# Patient Record
Sex: FEMALE | Race: WHITE | NOT HISPANIC OR LATINO | Employment: UNEMPLOYED | URBAN - NONMETROPOLITAN AREA
[De-identification: names, ages, dates, MRNs, and addresses within clinical notes are randomized per-mention and may not be internally consistent; named-entity substitution may affect disease eponyms.]

---

## 2018-10-09 ENCOUNTER — HOSPITAL ENCOUNTER (INPATIENT)
Facility: HOSPITAL | Age: 18
LOS: 2 days | Discharge: HOME | End: 2018-10-11
Attending: PSYCHIATRY & NEUROLOGY | Admitting: PSYCHIATRY & NEUROLOGY

## 2018-10-09 ENCOUNTER — HOSPITAL ENCOUNTER (EMERGENCY)
Facility: HOSPITAL | Age: 18
Discharge: PSYCHIATRIC OR SUBSTANCE USE FACILITY | End: 2018-10-09
Attending: EMERGENCY MEDICINE | Admitting: EMERGENCY MEDICINE

## 2018-10-09 VITALS
RESPIRATION RATE: 18 BRPM | HEART RATE: 61 BPM | OXYGEN SATURATION: 98 % | WEIGHT: 270 LBS | DIASTOLIC BLOOD PRESSURE: 71 MMHG | HEIGHT: 64 IN | SYSTOLIC BLOOD PRESSURE: 134 MMHG | TEMPERATURE: 97.1 F | BODY MASS INDEX: 46.1 KG/M2

## 2018-10-09 DIAGNOSIS — IMO0002 SELF-INFLICTED INJURY: Primary | ICD-10-CM

## 2018-10-09 DIAGNOSIS — F32.A DEPRESSION, UNSPECIFIED DEPRESSION TYPE: ICD-10-CM

## 2018-10-09 PROBLEM — F32.9 MAJOR DEPRESSIVE DISORDER: Status: ACTIVE | Noted: 2018-10-09

## 2018-10-09 LAB
6-ACETYL MORPHINE: NEGATIVE
ALBUMIN SERPL-MCNC: 4.3 G/DL (ref 3.5–5)
ALBUMIN/GLOB SERPL: 1.3 G/DL (ref 1.5–2.5)
ALP SERPL-CCNC: 83 U/L (ref 35–104)
ALT SERPL W P-5'-P-CCNC: 26 U/L (ref 10–36)
AMPHET+METHAMPHET UR QL: NEGATIVE
ANION GAP SERPL CALCULATED.3IONS-SCNC: 9.2 MMOL/L (ref 3.6–11.2)
AST SERPL-CCNC: 24 U/L (ref 10–30)
B-HCG UR QL: NEGATIVE
BACTERIA UR QL AUTO: ABNORMAL /HPF
BARBITURATES UR QL SCN: NEGATIVE
BASOPHILS # BLD AUTO: 0.03 10*3/MM3 (ref 0–0.3)
BASOPHILS NFR BLD AUTO: 0.2 % (ref 0–2)
BENZODIAZ UR QL SCN: NEGATIVE
BILIRUB SERPL-MCNC: 0.3 MG/DL (ref 0.2–1.8)
BILIRUB UR QL STRIP: NEGATIVE
BUN BLD-MCNC: 8 MG/DL (ref 7–21)
BUN/CREAT SERPL: 12.7 (ref 7–25)
BUPRENORPHINE SERPL-MCNC: NEGATIVE NG/ML
CALCIUM SPEC-SCNC: 9 MG/DL (ref 7.7–10)
CANNABINOIDS SERPL QL: NEGATIVE
CHLORIDE SERPL-SCNC: 108 MMOL/L (ref 99–112)
CLARITY UR: CLEAR
CO2 SERPL-SCNC: 23.8 MMOL/L (ref 24.3–31.9)
COCAINE UR QL: NEGATIVE
COLOR UR: ABNORMAL
CREAT BLD-MCNC: 0.63 MG/DL (ref 0.43–1.29)
DEPRECATED RDW RBC AUTO: 39.6 FL (ref 37–54)
EOSINOPHIL # BLD AUTO: 0.22 10*3/MM3 (ref 0–0.7)
EOSINOPHIL NFR BLD AUTO: 1.5 % (ref 0–5)
ERYTHROCYTE [DISTWIDTH] IN BLOOD BY AUTOMATED COUNT: 12.8 % (ref 11.5–14.5)
ETHANOL BLD-MCNC: <10 MG/DL
ETHANOL UR QL: <0.01 %
GFR SERPL CREATININE-BSD FRML MDRD: 122 ML/MIN/1.73
GLOBULIN UR ELPH-MCNC: 3.2 GM/DL
GLUCOSE BLD-MCNC: 87 MG/DL (ref 70–110)
GLUCOSE UR STRIP-MCNC: NEGATIVE MG/DL
HCT VFR BLD AUTO: 41.5 % (ref 37–47)
HGB BLD-MCNC: 13.8 G/DL (ref 12–16)
HGB UR QL STRIP.AUTO: ABNORMAL
HYALINE CASTS UR QL AUTO: ABNORMAL /LPF
IMM GRANULOCYTES # BLD: 0.05 10*3/MM3 (ref 0–0.03)
IMM GRANULOCYTES NFR BLD: 0.4 % (ref 0–0.5)
KETONES UR QL STRIP: ABNORMAL
LEUKOCYTE ESTERASE UR QL STRIP.AUTO: NEGATIVE
LYMPHOCYTES # BLD AUTO: 1.58 10*3/MM3 (ref 1–3)
LYMPHOCYTES NFR BLD AUTO: 11.1 % (ref 21–51)
MAGNESIUM SERPL-MCNC: 2.1 MG/DL (ref 1.6–2.2)
MCH RBC QN AUTO: 28.8 PG (ref 27–33)
MCHC RBC AUTO-ENTMCNC: 33.3 G/DL (ref 33–37)
MCV RBC AUTO: 86.6 FL (ref 80–94)
METHADONE UR QL SCN: NEGATIVE
MONOCYTES # BLD AUTO: 1.33 10*3/MM3 (ref 0.1–0.9)
MONOCYTES NFR BLD AUTO: 9.4 % (ref 0–10)
NEUTROPHILS # BLD AUTO: 10.99 10*3/MM3 (ref 1.4–6.5)
NEUTROPHILS NFR BLD AUTO: 77.4 % (ref 30–70)
NITRITE UR QL STRIP: NEGATIVE
OPIATES UR QL: NEGATIVE
OSMOLALITY SERPL CALC.SUM OF ELEC: 279 MOSM/KG (ref 273–305)
OXYCODONE UR QL SCN: NEGATIVE
PCP UR QL SCN: NEGATIVE
PH UR STRIP.AUTO: 5.5 [PH] (ref 5–8)
PLATELET # BLD AUTO: 242 10*3/MM3 (ref 130–400)
PMV BLD AUTO: 10.3 FL (ref 6–10)
POTASSIUM BLD-SCNC: 3.7 MMOL/L (ref 3.5–5.3)
PROT SERPL-MCNC: 7.5 G/DL (ref 6–8)
PROT UR QL STRIP: NEGATIVE
RBC # BLD AUTO: 4.79 10*6/MM3 (ref 4.2–5.4)
RBC # UR: ABNORMAL /HPF
REF LAB TEST METHOD: ABNORMAL
SODIUM BLD-SCNC: 141 MMOL/L (ref 135–153)
SP GR UR STRIP: 1.02 (ref 1–1.03)
SQUAMOUS #/AREA URNS HPF: ABNORMAL /HPF
UROBILINOGEN UR QL STRIP: ABNORMAL
WBC NRBC COR # BLD: 14.2 10*3/MM3 (ref 4.5–12.5)
WBC UR QL AUTO: ABNORMAL /HPF

## 2018-10-09 PROCEDURE — 85025 COMPLETE CBC W/AUTO DIFF WBC: CPT | Performed by: PHYSICIAN ASSISTANT

## 2018-10-09 PROCEDURE — 81001 URINALYSIS AUTO W/SCOPE: CPT | Performed by: PHYSICIAN ASSISTANT

## 2018-10-09 PROCEDURE — 80307 DRUG TEST PRSMV CHEM ANLYZR: CPT | Performed by: PHYSICIAN ASSISTANT

## 2018-10-09 PROCEDURE — 99283 EMERGENCY DEPT VISIT LOW MDM: CPT

## 2018-10-09 PROCEDURE — 81025 URINE PREGNANCY TEST: CPT | Performed by: PHYSICIAN ASSISTANT

## 2018-10-09 PROCEDURE — 80053 COMPREHEN METABOLIC PANEL: CPT | Performed by: PHYSICIAN ASSISTANT

## 2018-10-09 PROCEDURE — 83735 ASSAY OF MAGNESIUM: CPT | Performed by: PHYSICIAN ASSISTANT

## 2018-10-09 RX ORDER — FAMOTIDINE 20 MG/1
20 TABLET, FILM COATED ORAL 2 TIMES DAILY PRN
Status: DISCONTINUED | OUTPATIENT
Start: 2018-10-09 | End: 2018-10-11 | Stop reason: HOSPADM

## 2018-10-09 RX ORDER — BENZTROPINE MESYLATE 1 MG/1
1 TABLET ORAL DAILY PRN
Status: DISCONTINUED | OUTPATIENT
Start: 2018-10-09 | End: 2018-10-11 | Stop reason: HOSPADM

## 2018-10-09 RX ORDER — ALUMINA, MAGNESIA, AND SIMETHICONE 2400; 2400; 240 MG/30ML; MG/30ML; MG/30ML
15 SUSPENSION ORAL EVERY 6 HOURS PRN
Status: DISCONTINUED | OUTPATIENT
Start: 2018-10-09 | End: 2018-10-11 | Stop reason: HOSPADM

## 2018-10-09 RX ORDER — IBUPROFEN 600 MG/1
600 TABLET ORAL EVERY 6 HOURS PRN
Status: DISCONTINUED | OUTPATIENT
Start: 2018-10-09 | End: 2018-10-11 | Stop reason: HOSPADM

## 2018-10-09 RX ORDER — BENZTROPINE MESYLATE 1 MG/ML
0.5 INJECTION INTRAMUSCULAR; INTRAVENOUS DAILY PRN
Status: DISCONTINUED | OUTPATIENT
Start: 2018-10-09 | End: 2018-10-11 | Stop reason: HOSPADM

## 2018-10-09 RX ORDER — NICOTINE 21 MG/24HR
1 PATCH, TRANSDERMAL 24 HOURS TRANSDERMAL
Status: DISCONTINUED | OUTPATIENT
Start: 2018-10-10 | End: 2018-10-11 | Stop reason: HOSPADM

## 2018-10-09 RX ORDER — ONDANSETRON 4 MG/1
4 TABLET, FILM COATED ORAL EVERY 6 HOURS PRN
Status: DISCONTINUED | OUTPATIENT
Start: 2018-10-09 | End: 2018-10-11 | Stop reason: HOSPADM

## 2018-10-09 RX ORDER — HYDROXYZINE 50 MG/1
50 TABLET, FILM COATED ORAL EVERY 6 HOURS PRN
Status: DISCONTINUED | OUTPATIENT
Start: 2018-10-09 | End: 2018-10-11 | Stop reason: HOSPADM

## 2018-10-09 RX ORDER — LOPERAMIDE HYDROCHLORIDE 2 MG/1
2 CAPSULE ORAL 4 TIMES DAILY PRN
Status: DISCONTINUED | OUTPATIENT
Start: 2018-10-09 | End: 2018-10-11 | Stop reason: HOSPADM

## 2018-10-09 RX ORDER — ECHINACEA PURPUREA EXTRACT 125 MG
2 TABLET ORAL AS NEEDED
Status: DISCONTINUED | OUTPATIENT
Start: 2018-10-09 | End: 2018-10-11 | Stop reason: HOSPADM

## 2018-10-09 RX ORDER — TRAZODONE HYDROCHLORIDE 50 MG/1
50 TABLET ORAL NIGHTLY PRN
Status: DISCONTINUED | OUTPATIENT
Start: 2018-10-09 | End: 2018-10-11 | Stop reason: HOSPADM

## 2018-10-09 RX ORDER — BENZONATATE 100 MG/1
100 CAPSULE ORAL 3 TIMES DAILY PRN
Status: DISCONTINUED | OUTPATIENT
Start: 2018-10-09 | End: 2018-10-11 | Stop reason: HOSPADM

## 2018-10-09 RX ADMIN — HYDROXYZINE HYDROCHLORIDE 50 MG: 50 TABLET, FILM COATED ORAL at 21:40

## 2018-10-09 NOTE — ED PROVIDER NOTES
Subjective   17-year-old female presents to the ED today for a mental health evaluation.  She states she has been feeling very depressed for the past 2 months.  She states she has a history of anxiety and depression.  She denies any suicidal or homicidal ideations today.  She states she has been having some problems with her fiancé.  She states he got into an argument today which caused her depression to become worse and she cuts on both of her forearms with a piece of glass.  She states today was the first time she had upper cut.  She denies any drug use.  She states she does occasionally drink alcohol and she did have one drink today.  She states her appetite and sleep have been poor.  She states she is not currently on any medications.        History provided by:  Patient  Mental Health Problem   Presenting symptoms: depression and self-mutilation    Presenting symptoms: no suicidal thoughts    Degree of incapacity (severity):  Moderate  Onset quality:  Gradual  Duration:  2 months  Timing:  Constant  Progression:  Worsening  Chronicity:  Recurrent  Context: not alcohol use and not drug abuse    Treatment compliance:  Untreated  Relieved by:  Nothing  Worsened by:  Nothing  Associated symptoms: anxiety, appetite change, feelings of worthlessness, insomnia and poor judgment    Risk factors: hx of mental illness        Review of Systems   Constitutional: Positive for appetite change.   HENT: Negative.    Eyes: Negative.    Respiratory: Negative.    Cardiovascular: Negative.    Gastrointestinal: Negative.    Genitourinary: Negative.    Musculoskeletal: Negative.    Skin: Positive for wound.   Neurological: Negative.    Psychiatric/Behavioral: Positive for dysphoric mood, self-injury and sleep disturbance. Negative for suicidal ideas. The patient is nervous/anxious and has insomnia.    All other systems reviewed and are negative.      Past Medical History:   Diagnosis Date   • Anxiety    • Depression    •  Self-injurious behavior     Cut self 10/9/18       Allergies   Allergen Reactions   • Morphine Hives       Past Surgical History:   Procedure Laterality Date   • GASTROSCOPY     • TONSILLECTOMY      childhood       Family History   Problem Relation Age of Onset   • Schizophrenia Mother    • Alcohol abuse Mother    • No Known Problems Father    • Alcohol abuse Brother    • Drug abuse Brother        Social History     Social History   • Marital status: Single     Social History Main Topics   • Smoking status: Current Some Day Smoker     Packs/day: 1.00     Years: 4.00     Types: Cigarettes   • Smokeless tobacco: Never Used   • Alcohol use Yes      Comment: drinks occassionaly   • Drug use: No   • Sexual activity: Yes     Partners: Female     Other Topics Concern   • Not on file           Objective   Physical Exam   Constitutional: She is oriented to person, place, and time. She appears well-developed and well-nourished. No distress.   HENT:   Head: Normocephalic and atraumatic.   Right Ear: External ear normal.   Left Ear: External ear normal.   Nose: Nose normal.   Mouth/Throat: Oropharynx is clear and moist.   Eyes: Pupils are equal, round, and reactive to light. Conjunctivae and EOM are normal.   Neck: Normal range of motion. Neck supple.   Cardiovascular: Normal rate, regular rhythm, normal heart sounds and intact distal pulses.    Pulmonary/Chest: Effort normal and breath sounds normal.   Abdominal: Soft. Bowel sounds are normal.   Musculoskeletal: Normal range of motion.   Neurological: She is alert and oriented to person, place, and time.   Skin: Skin is warm and dry. Capillary refill takes less than 2 seconds.   Multiple superficial lacerations to both forearms   Psychiatric: Her speech is normal and behavior is normal. Cognition and memory are normal. She expresses impulsivity. She exhibits a depressed mood. She expresses no homicidal and no suicidal ideation.   Nursing note and vitals  reviewed.      Procedures           ED Course  ED Course as of Oct 09 2136   Tue Oct 09, 2018   1935 Medically clear for psych  [AH]      ED Course User Index  [AH] Talisha Duckworth, PA                  [facility]      Final diagnoses:   Self-inflicted injury   Depression, unspecified depression type            Mildred Bravo PA  10/09/18 2136

## 2018-10-09 NOTE — NURSING NOTE
"Assessment completed. Pt brought in by police when her fiance called them after pt became upset and cut bilateral inner forearms with a piece of glass. Pt noted to have superficial scratches to bilateral forearms. Pt states, \"I was trying to hurt myself not kill myself.\" Pt denies SI, HI and A/V hallucinations. Rates depression at 10/10, anxiety 8/10. Reports she has racing thoughts. Reports stressors as being from [place], having lived here for 8 months and having issues with her fiance. Pt denies drug use, reports that she drinks on occasion x 3 years. Her last drink was today. Pt reports that she was an inpatient in a mental health facility at age 14, they kept her for 1 week. She is not on any medications. Pt smokes 1 pack per day x 4 years. Reports sleep and appetite are poor. Intake process explained to patient. Any questions answered. Pt to TX room. Safety maintained.    Pt noted to have been changed to scrubs prior to my assessment.   "

## 2018-10-10 PROBLEM — F43.25 ADJUSTMENT DISORDER WITH MIXED DISTURBANCE OF EMOTIONS AND CONDUCT: Status: ACTIVE | Noted: 2018-10-09

## 2018-10-10 PROCEDURE — 93010 ELECTROCARDIOGRAM REPORT: CPT | Performed by: INTERNAL MEDICINE

## 2018-10-10 PROCEDURE — 93005 ELECTROCARDIOGRAM TRACING: CPT | Performed by: PSYCHIATRY & NEUROLOGY

## 2018-10-10 PROCEDURE — 99222 1ST HOSP IP/OBS MODERATE 55: CPT | Performed by: PSYCHIATRY & NEUROLOGY

## 2018-10-10 RX ADMIN — HYDROXYZINE HYDROCHLORIDE 50 MG: 50 TABLET, FILM COATED ORAL at 21:28

## 2018-10-10 RX ADMIN — HYDROXYZINE HYDROCHLORIDE 50 MG: 50 TABLET, FILM COATED ORAL at 15:09

## 2018-10-10 NOTE — PLAN OF CARE
Problem: Patient Care Overview  Goal: Plan of Care Review  Outcome: Ongoing (interventions implemented as appropriate)  PATIENT VERBALIZES ANXIETY THIS SHIFT. NO NEW ORDERS NOTED   10/10/18 4254   Coping/Psychosocial   Plan of Care Reviewed With patient   Coping/Psychosocial   Patient Agreement with Plan of Care agrees   Plan of Care Review   Progress no change       Problem: Overarching Goals (Adult)  Goal: Adheres to Safety Considerations for Self and Others  Outcome: Ongoing (interventions implemented as appropriate)    Goal: Optimized Coping Skills in Response to Life Stressors  Outcome: Ongoing (interventions implemented as appropriate)    Goal: Develops/Participates in Therapeutic [place] to Support Successful Transition  Outcome: Ongoing (interventions implemented as appropriate)

## 2018-10-10 NOTE — DISCHARGE INSTR - APPOINTMENTS
[facility]   [address] [address] [phone]    October 15 2018 at 10:00am with Silva      Please Fax Discharge Summary to [phone]

## 2018-10-10 NOTE — NURSING NOTE
Reviewed patient clinicals and lab work with Dr. GAETANO Greenberg. Admission orders read back and verified x 2. ED provider, patient and pharmacy aware.

## 2018-10-10 NOTE — PLAN OF CARE
Problem: Patient Care Overview  Goal: Plan of Care Review  Outcome: Ongoing (interventions implemented as appropriate)   10/10/18 0104   Coping/Psychosocial   Plan of Care Reviewed With patient   Coping/Psychosocial   Patient Agreement with Plan of Care agrees   Plan of Care Review   Progress no change   OTHER   Outcome Summary New Admit

## 2018-10-10 NOTE — PLAN OF CARE
Problem: Patient Care Overview  Goal: Individualization and Mutuality  Outcome: Ongoing (interventions implemented as appropriate)   10/10/18 1139 10/10/18 1144   Individualization   Patient Specific Preferences --  None specified    Patient Specific Goals (Include Timeframe) --  Patient will receive inpatient stabilization over 2-7 days approximately    Patient Specific Interventions --  Therapist will offer 1-4 indvidual sessions, family education, aftercare planning    Mutuality/Individual Preferences   What Anxieties, Fears, Concerns, or Questions Do You Have About Your Care? --  None identified    What Information Would Help Us Give You More Personalized Care? --  None identified    How Would You and/or Your Support Person Like to Participate in Your Care? --  Patient has signed consent to involve her sister Sonam in plan of care    Mutuality/Individual Preferences   How to Address Anxieties/Fears --  NA    Personal Strengths/Vulnerabilities   Patient Personal Strengths expressive of needs;expressive of emotions;family/social support;realistic evaluation of current/future capabilities;resilient;resourceful;spiritual/Anabaptism support;stable living environment;motivated for recovery --    Patient Vulnerabilities Ineffective coping, self harm  --          Goal: Discharge Needs Assessment  Outcome: Ongoing (interventions implemented as appropriate)   10/10/18 1144   Discharge Needs Assessment   Readmission Within the Last 30 Days no previous admission in last 30 days   Concerns to be Addressed mental health;coping/stress;relationship   Concerns Comments Patient will deny SI/HI and acute symptoms prior to discharge.    Patient/Family Anticipates Transition to home with family   Patient/Family Anticipated Services at Transition outpatient care;mental health services   Transportation Concerns car, none   Transportation Anticipated family or friend will provide   Offered/Gave Vendor List no   Patient's Choice of  Community Agency(s) Patient has signed consent for [facility]    Current Discharge Risk psychiatric illness   Discharge Coordination/Progress Patient plans to stay with family in [place] at discharge. Patient has signed consent for [facility]. Patient does not have insurance for discharge medications and may need assistance.    Discharge Needs Assessment,    Outpatient/Agency/Support Group Needs outpatient medication management;outpatient psychiatric care (specify);outpatient counseling   Anticipated Discharge Disposition home or self-care

## 2018-10-10 NOTE — H&P
INITIAL PSYCHIATRIC HISTORY & PHYSICAL    Patient Identification:  Name:     Angeline Zamudio  Age:    17 y.o.  Sex:    female  :     2000  MRN:    [id number]  Visit Number:    [id number]  Primary Care Physician:    Provider, No Known    SUBJECTIVE    CC/Focus of Exam: Thoughts of suicide and attempt, depression, anxiety, hopelessness  HPI: Angeline Zamudio is a 17 y.o.  female who was brought to emergency [facility] of [facility] by police officers.  Patient cut herself and called micah and told her and her fijude called the  that she did not come to check on her.  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  Patient has become very depressed and rating depression 10 and anxiety 10 on a scale of 1-10 and yesterday she felt hopeless and very frustrated and that's why she was cutting herself.  Patient seemed to be accepting that the relationship might be over and she wants to go back to her family in [place] as soon as she gets discharged from the hospital.  Patient's sleep has been poor and has been experiencing initial, intermittent and terminal insomnia.  Appetite has gone down slow patient is an obese person.  She has been feeling tired with low energy.  She has become indecisive and lost interest in the things she used to enjoy.  Patient denies history of physical or sexual abuse of the past.  She says her mother was an alcoholic however has been sober now for 7 years and her father raised her mostly at least up to age 14.  She also has a stepfather whom she says is good to her and as a matter of fact he even bought her a  car.  Patient denies any drug use that she drinks occasionally and she says she never gets drunk and she says she might drink 2-3 mixed drinks such as a strawberry daiquiri.  Patient is admitted for crisis intervention, stabilization and securing her safety.    Available medical/psychiatric records reviewed and incorporated into the current document.     PAST PSYCHIATRIC HX:  At age 14 patient was admitted to a psychiatric facility for a week.  She also was [facility].    SUBSTANCE USE HX:  As outlined in history of present a less patient denies drug use she drinks alcohol socially and occasionally.  She is smoking a pack of cigarettes a day.    SOCIAL HX:  Patient was born and raised in [place].  Patient was raised mostly by her father.  Parents  a long time ago.  Patient has a half-sister who is maternal but she says patient's father raised her and she has avoided brother.  She says her brother also was alcohol and drug user but no a sober.  Her mother and her sister both are RNs.  Patient has also couple of siblings who are paternal and she says her stepmother is expecting at this time.  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  Patient says that her family are supportive of her sexual orientation.    Past Medical History:   Diagnosis Date   • Anxiety    • Depression    • Self-injurious behavior     Cut self 10/9/18       Past Surgical History:   Procedure Laterality Date   • GASTROSCOPY     • TONSILLECTOMY      childhood       Family History   Problem Relation Age of Onset   • Schizophrenia Mother    • Alcohol abuse Mother    • No Known Problems Father    • Alcohol abuse Brother    • Drug abuse Brother          No prescriptions prior to admission.       Reviewed available past medical and psychiatric records.    ALLERGIES:  Morphine    Temp:  [97.3 °F (36.3 °C)-98.3 °F (36.8 °C)] 97.3 °F (36.3  °C)  Heart Rate:  [52-78] 52  Resp:  [18-20] 18  BP: (113-137)/(56-82) 113/61    REVIEW OF SYSTEMS:  Review of Systems   Constitutional: Negative.    HENT: Negative.    Eyes: Negative.    Respiratory: Negative.    Gastrointestinal: Negative.    Endocrine: Negative.    Genitourinary: Negative.    Musculoskeletal: Negative.    Skin: Negative.    Allergic/Immunologic: Negative.    Hematological: Negative.    Psychiatric/Behavioral: Positive for dysphoric mood.        OBJECTIVE    PHYSICAL EXAM:  Physical Exam     Physical Exam   Constitutional: oriented to person, place, and time. Appears well-developed and well-nourished.   HENT:   Head: Normocephalic and atraumatic.   Right Ear: External ear normal.   Left Ear: External ear normal.   Mouth/Throat: Oropharynx is clear and moist.   Eyes: Pupils are equal, round, and reactive to light. Conjunctivae and EOM are normal.   Neck: Normal range of motion. Neck supple.   Cardiovascular: Normal rate, regular rhythm and normal heart sounds.    Pulmonary/Chest: Effort normal and breath sounds normal. No respiratory distress. No wheezes.   Abdominal: Soft. Bowel sounds are normal.No distension. There is no tenderness.   Musculoskeletal: Normal range of motion. No edema or deformity.   Neurological:Alert and oriented to person, place, and time. No cranial nerve deficit. Coordination normal.   Skin: Skin is warm and dry. No rash noted. No erythema.         MENTAL STATUS EXAM:    Patient is a 17-year-old  female in the hospital gown.  Affect is appropriate.  She is cooperative and pleasant.  Her mood is depressed and anxious and rates it 8 on a scale of 1-10 and she feels hopeless, helpless and worthless.  Patient today denies thoughts of suicide however yesterday she cut her upper extremities though superficiality in the piece of glass.  She denies homicidal thoughts.  She is not delusional and not experiencing any perceptual distortion such as auditory or visual  hallucinations.  Her sensorium is intact saw her immediate, recent, recent past and long-term memories.  Her intellect is average and better.  Her insight and judgment are questionable.      Imaging Results (last 24 hours)     ** No results found for the last 24 hours. **           ECG/EMG Results (most recent)     Procedure Component Value Units Date/Time    ECG 12 Lead [[id number]] Collected:  10/10/18 0906     Updated:  10/10/18 0916    Narrative:       Test Reason : Potential adverse reaction to medications.  Blood Pressure : **/** mmHG  Vent. Rate : 059 BPM     Atrial Rate : 059 BPM     P-R Int : 152 ms          QRS Dur : 090 ms      QT Int : 416 ms       P-R-T Axes : 030 054 049 degrees     QTc Int : 411 ms    Sinus bradycardia with sinus arrhythmia  Otherwise normal ECG  No previous ECGs available    Referred By:  EMEKA           Confirmed By:            Lab Results   Component Value Date    GLUCOSE 87 10/09/2018    BUN 8 10/09/2018    CREATININE 0.63 10/09/2018    EGFRIFNONA 122 10/09/2018    BCR 12.7 10/09/2018    CO2 23.8 (L) 10/09/2018    CALCIUM 9.0 10/09/2018    ALBUMIN 4.30 10/09/2018    AST 24 10/09/2018    ALT 26 10/09/2018       Lab Results   Component Value Date    WBC 14.20 (H) 10/09/2018    HGB 13.8 10/09/2018    HCT 41.5 10/09/2018    MCV 86.6 10/09/2018     10/09/2018       Pain Management Panel     Pain Management Panel Latest Ref Rng & Units 10/9/2018    AMPHETAMINES SCREEN, URINE Negative Negative    BARBITURATES SCREEN Negative Negative    BENZODIAZEPINE SCREEN, URINE Negative Negative    BUPRENORPHINE Negative Negative    COCAINE SCREEN, URINE Negative Negative    METHADONE SCREEN, URINE Negative Negative          Brief Urine Lab Results  (Last result in the past 365 days)      Color   Clarity   Blood   Leuk Est   Nitrite   Protein   CREAT   Urine HCG        10/09/18 1907 Dark Yellow(A) Clear Large (3+)(A) Negative Negative Negative         10/09/18 1907               Negative            Reviewed labs and studies done with this admission.       ASSESSMENT & PLAN:        Adjustment disorder with mixed disturbance of emotions and conduct        The patient has been admitted for safety and stabilization.  Patient will be monitored for [id number]  and maintained on Suicide precaution Level 3 (q15 min checks) . she is followed with daily clinical evaluation and med management.    The patient will have individual and group therapy with a master's level therapist. A master treatment plan will be developed and agreed upon by the patient and his/her treatment team.  The patient's estimated length of stay in the hospital is 1-2 days.     This note was generated using a scribe, Tono Monte The work documented in this note was completed, reviewed, and approved by the attending psychiatrist as designated Dr. Indra Lawson signature.     I, Indra Lawson MD, personally performed the services described in this documentation as scribed by the above named individual in my presence, and it is both accurate and complete.

## 2018-10-10 NOTE — NURSING NOTE
Presented to ED via police.  States that she cut herself with a piece of glass following a conflict with her fiance.  Her fiance was not home and feared that pt might harm herself further, so she contacted the police.  Pt denies suicide intent stating that she was just trying to hurt herself.  Denies any previous hx of self harming behavior or suicide attempts. Does reports hx of anxiety and depression, and an inpatient admission when she was 14.  No current mental health treatment or medications.

## 2018-10-10 NOTE — PROGRESS NOTES
1100:       DATA:       Therapist met individually with patient this date to introduce role and to discuss hospitalization expectations. Patient agreeable.     Therapist provided emotional support and education this date.   Discussed the therapist/patient relationship and explain the parameters and limitations of relative confidentiality.  Also discussed the importance active participation, and honesty to the treatment process.  Encouraged patient to utilize individual sessions to discuss/vent their feelings, frustrations, and fears.      Therapist completed integrated summary, treatment plan, and initiated social history this date.  Therapist is strongly recommending a family session prior to discharge.  Patient signed consent for her sister Michelle Carr at [phone]; no answer/left voicemail     1205: Therapist received call back from patient's sister Sonam.  She appeared very supportive and reports that patient will be staying with her at discharge. She reports no concerns with patient's safety if discharged today or tomorrow.  Sonam appeared to believe that patient's main issue was possibly her relationship and leaving it/returning home would be the best plan.       ASSESSMENT:     Ms. Angeline Zamudio is a 17 year old , single, unemployed female.  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  Patient last employed 1 week ago at [employer].  Patient resides in [facility] x 6 months.  Patient originally from [facility].  Patient required admission due to self harming behavior.  Patient reports that she cut herself superficially on her forearms due to being upset/frustrated. She denies SI intent.  Patient reports that she and her fiance were arguing about the status of their relationship. She had also found out that her fiance cheated on her.  Patient reports that her fiance called the police.  Patient states that the relationship is probably over and that she has  already made plans to return to [place] and live with her sister.  Patient has history of admission to [facility] which is an inpatient facility at age 14.  She denies SI attempts.  Patient was primarily raised by her father.  She reports that her mother had a history of alcoholism, but is now sober and a nurse.  She reports having a supportive childhood and denies abuse.  Patient denies alcohol/drug issues.  She does admit to drinking 2-3 strawberry daiquiries occassionally, but denies abuse. Today, patient is calm and cooperative.  Patient appears to minimize events leading to hospitalization and requests discharge home.  Patient reports history of outpatient treatment at [facility] as a teenager, and requests new referral. Patient plans to return to [place] with family at discharge.       PLAN:      Patient to remain hospitalized this date.     Treatment team will focus efforts on stabilizing patient's acute symptoms while providing education on healthy coping and crisis management to reduce hospitalizations.   Patient requires daily psychiatrist evaluation and 24/7 nursing supervision to promote patient  safety.    Therapist will offer 1-4 individual sessions (20-30 minutes each), 1 therapy group daily, family education, and appropriate referral.    Therapist recommends outpatient psychiatric referral. Patient reports that she is moving to [place] at discharge and requested appointment there.  Therapist gained consent for [facility]; patient has seen therapist Silva there.

## 2018-10-11 VITALS
SYSTOLIC BLOOD PRESSURE: 113 MMHG | BODY MASS INDEX: 43.71 KG/M2 | DIASTOLIC BLOOD PRESSURE: 61 MMHG | WEIGHT: 256 LBS | TEMPERATURE: 97.3 F | HEART RATE: 52 BPM | OXYGEN SATURATION: 98 % | RESPIRATION RATE: 18 BRPM | HEIGHT: 64 IN

## 2018-10-11 PROCEDURE — 99238 HOSP IP/OBS DSCHRG MGMT 30/<: CPT | Performed by: PSYCHIATRY & NEUROLOGY

## 2018-10-11 NOTE — PLAN OF CARE
Problem: Patient Care Overview  Goal: Plan of Care Review  Outcome: Ongoing (interventions implemented as appropriate)  Patient reports eating and sleeping well; rates anxiety 3; depression 2; denies SI/HI/AVH; reoriented to time; pt calm and cooperative.   10/11/18 0118   Coping/Psychosocial   Plan of Care Reviewed With patient   Coping/Psychosocial   Patient Agreement with Plan of Care agrees   Plan of Care Review   Progress no change

## 2018-10-11 NOTE — PLAN OF CARE
Problem: Patient Care Overview  Goal: Plan of Care Review  Outcome: Outcome(s) achieved Date Met: 10/11/18   10/11/18 1234   Coping/Psychosocial   Plan of Care Reviewed With patient   Coping/Psychosocial   Patient Agreement with Plan of Care agrees   Plan of Care Review   Progress improving   OTHER   Outcome Summary Ready for discharge.       Problem: Overarching Goals (Adult)  Goal: Adheres to Safety Considerations for Self and Others  Outcome: Outcome(s) achieved Date Met: 10/11/18    Goal: Optimized Coping Skills in Response to Life Stressors  Outcome: Outcome(s) achieved Date Met: 10/11/18    Goal: Develops/Participates in Therapeutic [place] to Support Successful Transition  Outcome: Outcome(s) achieved Date Met: 10/11/18      Problem: Suicidal Behavior (Adult)  Goal: Suicidal Behavior is Absent/Minimized/Managed  Outcome: Outcome(s) achieved Date Met: 10/11/18      Problem: Behavior Regulation (Impulse Control) Impairment (Nonsuicidal Self-injury) (Adult)  Goal: Improved Behavior Regulation and Impulse Control (Nonsuicidal Self-injury)  Outcome: Outcome(s) achieved Date Met: 10/11/18      Problem: Emotion/Mood Impairment (Nonsuicidal Self-injury) (Adult)  Goal: Improved Mood Symptoms (Nonsuicidal Self-injury)  Outcome: Outcome(s) achieved Date Met: 10/11/18

## 2018-10-11 NOTE — DISCHARGE SUMMARY
"      PSYCHIATRIC DISCHARGE SUMMARY     Patient Identification:  Name:  Angeline aZmudio  Age:  17 y.o.  Sex:  female  :  2000  MRN:  [id number]  Visit Number:  [id number]      Date of Admission:10/9/2018   Date of Discharge:  10/11/2018    Discharge Diagnosis:  Active Problems:    Adjustment disorder with mixed disturbance of emotions and conduct        Admission Diagnosis:  Major depressive disorder [F32.9]     Hospital Course  Patient is a 17 y.o. female presented with superficial self inflicted lacerations to both her arms after a breakup with her girlfriend. She called her friend but her friend called the police who brought the patient to the hospital. The patient was admitted to the [facility] unit for safety, further evaluation and treatment.  The patient reported that it was an impulsive thing to do and reported that she was over the relationship. She agreed to be monitored for safety and if she continued to do well to be discharged next day.  The patient was also able to take part in individual and group counseling sessions and work on appropriate coping skills.  The patient made steady improvement in her mood and expressed feeling more positive and hopeful about future. Sleep and appetite were improved.  The day of discharge the patient was calm, cooperative and pleasant. Mood was reported to be good, and denied SI/HI/AVH. Also reported no medication side effects.        Mental Status Exam upon discharge:   Mood \"good\"   Affect-congruent, appropriate, stable  Thought Content-goal directed, no delusional material present  Thought process-linear, organized.  Suicidality: No SI  Homicidality: No HI  Perception: No AH/VH    Procedures Performed         Consults:   Consults     No orders found from 9/10/2018 to 10/10/2018.          Pertinent Test Results:   CBC, CMP, UA and UDS were unremarkable except WBC 14.20.    Condition on Discharge:  improved    Vital Signs  Temp:  [97.3 °F (36.3 °C)-98.3 " °F (36.8 °C)] 97.3 °F (36.3 °C)  Heart Rate:  [52-78] 52  Resp:  [18-20] 18  BP: (113-137)/(56-82) 113/61      Discharge Disposition:  Home or Self Care    Discharge Medications:     Discharge Medications      Patient Not Prescribed Medications Upon Discharge         Discharge Diet: Regular    Activity at Discharge: As tolerated    Follow-up Appointments  Future Appointments  Date Time Provider Department Center   10/12/2018 3:00 PM Alix Frausto PA [facility]  [facility] None         Clinician:   Indra Lawson MD  10/11/18  12:06 PM

## 2018-10-11 NOTE — PROGRESS NOTES
1050:      DATA:      Therapist met individually with patient this date. Reintroduced self to patient from initial assessment began yesterday.  Discussed progress in treatment and any needs/concerns. Patient reports that she had a good night, but is eager to return home.  She reports that a friend will pick her up so that she can get her car and dog in [place]. Patient plans to go to her sister Sonam's house from there.  Therapist contacted Sonam at [phone]; she reports that she is agreeable to this plan and does not have safety concerns with patient returning to her home.  Therapist reviewed aftercare appointment with [facility].  Patient/family agreeable.       ASSESSMENT:     Patient observed to have appropriate affect and congruent mood this date. Patient denies SI/HI and acute symptoms this date.  Patient appears calm, cooperative, and future oriented.      Plan:    Patient to discharge home this date.  She plans to have a friend pick her up and take her to her car.  Patient plans to drive to family's home in [place] from there.        Assisted patient in identifying risk factors which would indicate the need for higher level of care including thoughts to harm self or others and/or self-harming behavior and encouraged patient to contact this office, call 911, or present to the nearest emergency room should any of these events occur. Discussed crisis intervention services and means to access.  Patient adamantly and convincingly denies current suicidal or homicidal ideation or perceptual disturbance.

## 2019-08-02 ENCOUNTER — OFFICE VISIT (OUTPATIENT)
Dept: FAMILY MEDICINE CLINIC | Facility: CLINIC | Age: 19
End: 2019-08-02

## 2019-08-02 VITALS
HEART RATE: 82 BPM | OXYGEN SATURATION: 98 % | DIASTOLIC BLOOD PRESSURE: 74 MMHG | WEIGHT: 264 LBS | SYSTOLIC BLOOD PRESSURE: 126 MMHG | RESPIRATION RATE: 16 BRPM | HEIGHT: 64 IN | BODY MASS INDEX: 45.07 KG/M2 | TEMPERATURE: 97.2 F

## 2019-08-02 DIAGNOSIS — G89.29 OTHER CHRONIC BACK PAIN: Chronic | ICD-10-CM

## 2019-08-02 DIAGNOSIS — F41.9 ANXIETY AND DEPRESSION: Primary | Chronic | ICD-10-CM

## 2019-08-02 DIAGNOSIS — F32.A ANXIETY AND DEPRESSION: Primary | Chronic | ICD-10-CM

## 2019-08-02 DIAGNOSIS — M54.9 OTHER CHRONIC BACK PAIN: Chronic | ICD-10-CM

## 2019-08-02 PROCEDURE — 99203 OFFICE O/P NEW LOW 30 MIN: CPT | Performed by: NURSE PRACTITIONER

## 2019-08-02 RX ORDER — PHENTERMINE HYDROCHLORIDE 37.5 MG/1
37.5 TABLET ORAL
COMMUNITY
End: 2020-07-30

## 2019-08-02 NOTE — PROGRESS NOTES
"Angeline Zamudio is a 18 y.o. female who presents to the office today to establish care. She is single and is employed at the [employer].  Her concerns today include: Anxiety and Depression: a recent ED visit for a Panic Attack which the cause was determined to be the Adipex she had started. She has reduced her daily dose to half and she has had no further attacks. The records have been requested and reviewed.   She also would like to pursue Breast Reduction. She shares she has experienced daily upper back and shoulder pain due to her breast size since high school. They also impact her sleep \" they suffocate me at times during the night\".      Anxiety   Presents for initial visit. Onset was more than 5 years ago. The problem has been gradually improving. Symptoms include depressed mood (Improving), excessive worry, insomnia, irritability, malaise, nervous/anxious behavior, panic and restlessness. Patient reports no chest pain, confusion, decreased concentration, dizziness, dry mouth, hyperventilation, muscle tension, nausea, obsessions, palpitations, shortness of breath or suicidal ideas. Primary symptoms comment: BiPolar. Symptoms occur most days. Current severity: Varies. Exacerbated by: Varies..Worse with PMS. The quality of sleep is poor. Nighttime awakenings: several.     Risk factors include family history. Treatments tried: Lexapro. The treatment provided no relief.      Vitals:    08/02/19 1021   BP: 126/74   BP Location: Left arm   Patient Position: Sitting   Pulse: 82   Resp: 16   Temp: 97.2 °F (36.2 °C)   TempSrc: Temporal   SpO2: 98%   Weight: 120 kg (264 lb)   Height: 162.6 cm (64\")      The following portions of the patient's history were reviewed and updated as appropriate: allergies, current medications, past family history, past medical history, past social history, past surgical history and problem list.    Review of Systems   Constitutional: Positive for irritability. Negative for " activity change, appetite change, chills, fatigue, fever and unexpected weight change.   HENT: Negative.    Eyes: Negative for visual disturbance.   Respiratory: Negative for cough, chest tightness, shortness of breath and wheezing.    Cardiovascular: Negative for chest pain, palpitations and leg swelling.   Gastrointestinal: Negative for abdominal pain, constipation, diarrhea, nausea and vomiting.   Endocrine: Negative for cold intolerance, heat intolerance, polydipsia, polyphagia and polyuria.   Musculoskeletal: Positive for back pain.   Skin: Negative for color change and rash.   Neurological: Negative for dizziness, tremors, speech difficulty, weakness, light-headedness and headaches.   Psychiatric/Behavioral: Negative for confusion, decreased concentration, self-injury and suicidal ideas. The patient is nervous/anxious and has insomnia.    All other systems reviewed and are negative.    Physical Exam   Constitutional: She is oriented to person, place, and time. She appears well-developed and well-nourished. No distress.   HENT:   Head: Normocephalic.   Right Ear: Tympanic membrane and ear canal normal.   Left Ear: Tympanic membrane and ear canal normal.   Nose: Nose normal.   Mouth/Throat: Oropharynx is clear and moist and mucous membranes are normal. No oropharyngeal exudate.   Eyes: Conjunctivae and EOM are normal. Pupils are equal, round, and reactive to light. Right eye exhibits no discharge. Left eye exhibits no discharge. No scleral icterus.   Neck: Neck supple. No JVD present. No thyromegaly present.   Cardiovascular: Normal rate, regular rhythm and normal heart sounds. Exam reveals no friction rub.   No murmur heard.  Pulmonary/Chest: Effort normal and breath sounds normal. No respiratory distress. She has no decreased breath sounds. She has no wheezes. She has no rhonchi. She has no rales.   Abdominal: Soft. Bowel sounds are normal. She exhibits no distension. There is no tenderness. There is no  rebound and no guarding.   Musculoskeletal: She exhibits tenderness (Upper back and shoulder region). She exhibits no edema.   Lymphadenopathy:     She has no cervical adenopathy.   Neurological: She is alert and oriented to person, place, and time.   Skin: Skin is warm and dry. Capillary refill takes less than 2 seconds. No rash noted. No erythema.   Psychiatric: She has a normal mood and affect. Her speech is normal and behavior is normal. Judgment and thought content normal.   Vitals reviewed.    Assessment/Plan     Patient's Body mass index is 45.32 kg/m². BMI is above normal parameters. Recommendations include: exercise counseling, nutrition counseling and continue with weight management program.   (Normal BMI:  18.5-24.9, OW 25-29.9, Obesity 30 or greater)    Assessment/Plan (cont)  Problems Addressed this Visit        Digestive    BMI 45.0-49.9, adult (CMS/HCC)    Relevant Orders    Lipid Panel       Nervous and Auditory    Chronic back pain    Relevant Orders    Ambulatory Referral to General Surgery (Completed)      Other Visit Diagnoses     Anxiety and depression  (Chronic)   -  Primary    Referral placed for Behavioral Health. Labs ordered    Relevant Orders    Ambulatory Referral to Behavioral Health    CBC & Differential    Comprehensive Metabolic Panel    TSH        Findings and recommendations discussed with Angeline. Treatment options reviewed. Referral made to Behavioral Health for Chronic Anxiety and Depression. Referral sent for consult with Dr Lalita Gerber in [place], Angeline's preference. Labs ordered for her to have completed and she will f/u with me at the end of August; sooner if problems/concerns occur. Lifestyle modifications including nutrition and exercise encouraged.         This document has been electronically signed by SYDNEY Arnold, BENTON-BC, ALEKS

## 2019-08-02 NOTE — PATIENT INSTRUCTIONS
Living With Anxiety    After being diagnosed with an anxiety disorder, you may be relieved to know why you have felt or behaved a certain way. It is natural to also feel overwhelmed about the treatment ahead and what it will mean for your life. With care and support, you can manage this condition and recover from it.  How to cope with anxiety  Dealing with stress  Stress is your body’s reaction to life changes and events, both good and bad. Stress can last just a few hours or it can be ongoing. Stress can play a major role in anxiety, so it is important to learn both how to cope with stress and how to think about it differently.  Talk with your health care provider or a counselor to learn more about stress reduction. He or she may suggest some stress reduction techniques, such as:  · Music therapy. This can include creating or listening to music that you enjoy and that inspires you.  · Mindfulness-based meditation. This involves being aware of your normal breaths, rather than trying to control your breathing. It can be done while sitting or walking.  · Centering prayer. This is a kind of meditation that involves focusing on a word, phrase, or sacred image that is meaningful to you and that brings you peace.  · Deep breathing. To do this, expand your stomach and inhale slowly through your nose. Hold your breath for 3-5 seconds. Then exhale slowly, allowing your stomach muscles to relax.  · Self-talk. This is a skill where you identify thought patterns that lead to anxiety reactions and correct those thoughts.  · Muscle relaxation. This involves tensing muscles then relaxing them.  Choose a stress reduction technique that fits your lifestyle and personality. Stress reduction techniques take time and practice. Set aside 5-15 minutes a day to do them. Therapists can offer training in these techniques. The training may be covered by some insurance plans. Other things you can do to manage stress include:  · Keeping a  stress diary. This can help you learn what triggers your stress and ways to control your response.  · Thinking about how you respond to certain situations. You may not be able to control everything, but you can control your reaction.  · Making time for activities that help you relax, and not feeling guilty about spending your time in this way.  Therapy combined with coping and stress-reduction skills provides the best chance for successful treatment.  Medicines  Medicines can help ease symptoms. Medicines for anxiety include:  · Anti-anxiety drugs.  · Antidepressants.  · Beta-blockers.  Medicines may be used as the main treatment for anxiety disorder, along with therapy, or if other treatments are not working. Medicines should be prescribed by a health care provider.  Relationships  Relationships can play a big part in helping you recover. Try to spend more time connecting with trusted friends and family members. Consider going to couples counseling, taking family education classes, or going to family therapy. Therapy can help you and others better understand the condition.  How to recognize changes in your condition  Everyone has a different response to treatment for anxiety. Recovery from anxiety happens when symptoms decrease and stop interfering with your daily activities at home or work. This may mean that you will start to:  · Have better concentration and focus.  · Sleep better.  · Be less irritable.  · Have more energy.  · Have improved memory.  It is important to recognize when your condition is getting worse. Contact your health care provider if your symptoms interfere with home or work and you do not feel like your condition is improving.  Where to find help and support:  You can get help and support from these sources:  · Self-help groups.  · Online and community organizations.  · A trusted spiritual leader.  · Couples counseling.  · Family education classes.  · Family therapy.  Follow these instructions  at home:  · Eat a healthy diet that includes plenty of vegetables, fruits, whole grains, low-fat dairy products, and lean protein. Do not eat a lot of foods that are high in solid fats, added sugars, or salt.  · Exercise. Most adults should do the following:  ? Exercise for at least 150 minutes each week. The exercise should increase your heart rate and make you sweat (moderate-intensity exercise).  ? Strengthening exercises at least twice a week.  · Cut down on caffeine, tobacco, alcohol, and other potentially harmful substances.  · Get the right amount and quality of sleep. Most adults need 7-9 hours of sleep each night.  · Make choices that simplify your life.  · Take over-the-counter and prescription medicines only as told by your health care provider.  · Avoid caffeine, alcohol, and certain over-the-counter cold medicines. These may make you feel worse. Ask your pharmacist which medicines to avoid.  · Keep all follow-up visits as told by your health care provider. This is important.  Questions to ask your health care provider  · Would I benefit from therapy?  · How often should I follow up with a health care provider?  · How long do I need to take medicine?  · Are there any long-term side effects of my medicine?  · Are there any alternatives to taking medicine?  Contact a health care provider if:  · You have a hard time staying focused or finishing daily tasks.  · You spend many hours a day feeling worried about everyday life.  · You become exhausted by worry.  · You start to have headaches, feel tense, or have nausea.  · You urinate more than normal.  · You have diarrhea.  Get help right away if:  · You have a racing heart and shortness of breath.  · You have thoughts of hurting yourself or others.  If you ever feel like you may hurt yourself or others, or have thoughts about taking your own life, get help right away. You can go to your nearest emergency department or call:  · Your local emergency services  "(911 in the [place]  · A suicide crisis helpline, such as the National Suicide Prevention Lifeline at 1-183.760.9333. This is open 24-hours a day.  Summary  · Taking steps to deal with stress can help calm you.  · Medicines cannot cure anxiety disorders, but they can help ease symptoms.  · Family, friends, and partners can play a big part in helping you recover from an anxiety disorder.  This information is not intended to replace advice given to you by your health care provider. Make sure you discuss any questions you have with your health care provider.  Document Released: 12/12/2017 Document Revised: 12/12/2017 Document Reviewed: 12/12/2017  [employer] Interactive Patient Education © 2019 [employer]  DASH Eating Plan  DASH stands for \"Dietary Approaches to Stop Hypertension.\" The DASH eating plan is a healthy eating plan that has been shown to reduce high blood pressure (hypertension). It may also reduce your risk for type 2 diabetes, heart disease, and stroke. The DASH eating plan may also help with weight loss.  What are tips for following this plan?    General guidelines  · Avoid eating more than 2,300 mg (milligrams) of salt (sodium) a day. If you have hypertension, you may need to reduce your sodium intake to 1,500 mg a day.  · Limit alcohol intake to no more than 1 drink a day for nonpregnant women and 2 drinks a day for men. One drink equals 12 oz of beer, 5 oz of wine, or 1½ oz of hard liquor.  · Work with your health care provider to maintain a healthy body weight or to lose weight. Ask what an ideal weight is for you.  · Get at least 30 minutes of exercise that causes your heart to beat faster (aerobic exercise) most days of the week. Activities may include walking, swimming, or biking.  · Work with your health care provider or diet and nutrition specialist (dietitian) to adjust your eating plan to your individual calorie needs.  Reading food labels    · Check food labels for the amount of sodium per " "serving. Choose foods with less than 5 percent of the Daily Value of sodium. Generally, foods with less than 300 mg of sodium per serving fit into this eating plan.  · To find whole grains, look for the word \"whole\" as the first word in the ingredient list.  Shopping  · Buy products labeled as \"low-sodium\" or \"no salt added.\"  · Buy fresh foods. Avoid canned foods and premade or frozen meals.  Cooking  · Avoid adding salt when cooking. Use salt-free seasonings or herbs instead of table salt or sea salt. Check with your health care provider or pharmacist before using salt substitutes.  · Do not french foods. Cook foods using healthy methods such as baking, boiling, grilling, and broiling instead.  · Cook with heart-healthy oils, such as olive, canola, soybean, or sunflower oil.  Meal planning  · Eat a balanced diet that includes:  ? 5 or more servings of fruits and vegetables each day. At each meal, try to fill half of your plate with fruits and vegetables.  ? Up to 6-8 servings of whole grains each day.  ? Less than 6 oz of lean meat, poultry, or fish each day. A 3-oz serving of meat is about the same size as a deck of cards. One egg equals 1 oz.  ? 2 servings of low-fat dairy each day.  ? A serving of nuts, seeds, or beans 5 times each week.  ? Heart-healthy fats. Healthy fats called Omega-3 fatty acids are found in foods such as flaxseeds and coldwater fish, like sardines, salmon, and mackerel.  · Limit how much you eat of the following:  ? Canned or prepackaged foods.  ? Food that is high in trans fat, such as fried foods.  ? Food that is high in saturated fat, such as fatty meat.  ? Sweets, desserts, sugary drinks, and other foods with added sugar.  ? Full-fat dairy products.  · Do not salt foods before eating.  · Try to eat at least 2 vegetarian meals each week.  · Eat more home-cooked food and less restaurant, buffet, and fast food.  · When eating at a restaurant, ask that your food be prepared with less salt or " no salt, if possible.  What foods are recommended?  The items listed may not be a complete list. Talk with your dietitian about what dietary choices are best for you.  Grains  Whole-grain or whole-wheat bread. Whole-grain or whole-wheat pasta. Brown rice. Oatmeal. Quinoa. Bulgur. Whole-grain and low-sodium cereals. Geovanna bread. Low-fat, low-sodium crackers. Whole-wheat flour tortillas.  Vegetables  Fresh or frozen vegetables (raw, steamed, roasted, or grilled). Low-sodium or reduced-sodium tomato and vegetable juice. Low-sodium or reduced-sodium tomato sauce and tomato paste. Low-sodium or reduced-sodium canned vegetables.  Fruits  All fresh, dried, or frozen fruit. Canned fruit in natural juice (without added sugar).  Meat and other protein foods  Skinless chicken or turkey. Ground chicken or turkey. Pork with fat trimmed off. Fish and seafood. Egg whites. Dried beans, peas, or lentils. Unsalted nuts, nut butters, and seeds. Unsalted canned beans. Lean cuts of beef with fat trimmed off. Low-sodium, lean deli meat.  Dairy  Low-fat (1%) or fat-free (skim) milk. Fat-free, low-fat, or reduced-fat cheeses. Nonfat, low-sodium ricotta or cottage cheese. Low-fat or nonfat yogurt. Low-fat, low-sodium cheese.  Fats and oils  Soft margarine without trans fats. Vegetable oil. Low-fat, reduced-fat, or light mayonnaise and salad dressings (reduced-sodium). Canola, safflower, olive, soybean, and sunflower oils. Avocado.  Seasoning and other foods  Herbs. Spices. Seasoning mixes without salt. Unsalted popcorn and pretzels. Fat-free sweets.  What foods are not recommended?  The items listed may not be a complete list. Talk with your dietitian about what dietary choices are best for you.  Grains  Baked goods made with fat, such as croissants, muffins, or some breads. Dry pasta or rice meal packs.  Vegetables  Creamed or fried vegetables. Vegetables in a cheese sauce. Regular canned vegetables (not low-sodium or reduced-sodium).  Regular canned tomato sauce and paste (not low-sodium or reduced-sodium). Regular tomato and vegetable juice (not low-sodium or reduced-sodium). Pickles. Olives.  Fruits  Canned fruit in a light or heavy syrup. Fried fruit. Fruit in cream or butter sauce.  Meat and other protein foods  Fatty cuts of meat. Ribs. Fried meat. Nuñez. Sausage. Bologna and other processed lunch meats. Salami. Fatback. Hotdogs. Bratwurst. Salted nuts and seeds. Canned beans with added salt. Canned or smoked fish. Whole eggs or egg yolks. Chicken or turkey with skin.  Dairy  Whole or 2% milk, cream, and half-and-half. Whole or full-fat cream cheese. Whole-fat or sweetened yogurt. Full-fat cheese. Nondairy creamers. Whipped toppings. Processed cheese and cheese spreads.  Fats and oils  Butter. Stick margarine. Lard. Shortening. Ghee. Nuñez fat. Tropical oils, such as coconut, palm kernel, or palm oil.  Seasoning and other foods  Salted popcorn and pretzels. Onion salt, garlic salt, seasoned salt, table salt, and sea salt. Worcestershire sauce. Tartar sauce. Barbecue sauce. Teriyaki sauce. Soy sauce, including reduced-sodium. Steak sauce. Canned and packaged gravies. Fish sauce. Oyster sauce. Cocktail sauce. Horseradish that you find on the shelf. Ketchup. Mustard. Meat flavorings and tenderizers. Bouillon cubes. Hot sauce and Tabasco sauce. Premade or packaged marinades. Premade or packaged taco seasonings. Relishes. Regular salad dressings.  Where to find more information:  · National Heart, Lung, and Blood [place]: www.nhlbi.nih.gov  · American Heart Association: www.heart.org  Summary  · The DASH eating plan is a healthy eating plan that has been shown to reduce high blood pressure (hypertension). It may also reduce your risk for type 2 diabetes, heart disease, and stroke.  · With the DASH eating plan, you should limit salt (sodium) intake to 2,300 mg a day. If you have hypertension, you may need to reduce your sodium intake to 1,500 mg  a day.  · When on the DASH eating plan, aim to eat more fresh fruits and vegetables, whole grains, lean proteins, low-fat dairy, and heart-healthy fats.  · Work with your health care provider or diet and nutrition specialist (dietitian) to adjust your eating plan to your individual calorie needs.  This information is not intended to replace advice given to you by your health care provider. Make sure you discuss any questions you have with your health care provider.  Document Released: 12/06/2012 Document Revised: 12/11/2017 Document Reviewed: 12/11/2017  [employer] Interactive Patient Education © 2019 [employer]

## 2019-08-03 PROBLEM — G89.29 CHRONIC BACK PAIN: Status: ACTIVE | Noted: 2019-08-03

## 2019-08-03 PROBLEM — M54.9 CHRONIC BACK PAIN: Status: ACTIVE | Noted: 2019-08-03

## 2019-08-19 ENCOUNTER — OFFICE VISIT (OUTPATIENT)
Dept: PSYCHIATRY | Facility: CLINIC | Age: 19
End: 2019-08-19

## 2019-08-19 VITALS
TEMPERATURE: 97.7 F | BODY MASS INDEX: 44.05 KG/M2 | HEIGHT: 64 IN | HEART RATE: 88 BPM | OXYGEN SATURATION: 97 % | DIASTOLIC BLOOD PRESSURE: 70 MMHG | SYSTOLIC BLOOD PRESSURE: 110 MMHG | WEIGHT: 258 LBS

## 2019-08-19 DIAGNOSIS — F41.1 GENERALIZED ANXIETY DISORDER: ICD-10-CM

## 2019-08-19 DIAGNOSIS — F33.1 MAJOR DEPRESSIVE DISORDER, RECURRENT EPISODE, MODERATE (HCC): Primary | ICD-10-CM

## 2019-08-19 PROCEDURE — 90792 PSYCH DIAG EVAL W/MED SRVCS: CPT | Performed by: NURSE PRACTITIONER

## 2019-08-19 NOTE — PROGRESS NOTES
Subjective   Angeline Zamudio is a 18 y.o. female who is here today for initial appointment to evaluate for medication options.     Chief Complaint:  Depression and anxiety     HPI:  History of Present Illness  Patient presents today for an initial evaluation for medication management for depression and anxiety.  Patient states last year on October 9 she attempted suicide by cutting her arms and legs.  Patient states the police showed up at her house and took her to [facility].  Patient states she spent a few days it [facility] however at discharge was supposed to set up an emergency plan and did not.  Patient states she has dealt with depression anxiety since her teenage years.  Patient states last year there was a lot of situational things going on that caused her to go through with the attempt.  Patient reports she stopped going into work, having problems with her fiancé, and her grandfather passed away.  Patient states currently she does not have as much problems with depression as much as just anger.  Patient rates her depression at a 3 or 4 on a 0-to-10 scale with 10 being the worst.  Patient states she has mood swings every day and the smallest thing can cause a trigger.  Patient states she will be feeling okay and then get extremely irritable and angry.  Patient states the triggers are usually thoughts going through her head.  Patient reports that her mind is always racing and she has been known to get aggressive with her anger.  Patient denies ever hitting an individual however she has hit walls and other items in the home.  Patient states her anxiety is at a 8 or 9 on a 0-to-10 scale with 10 being the worst.  Patient states symptoms of anxiety of constant worry and mind racing.  Patient states she did start the weight loss medicine Adipex from a weight loss clinic in [place] and these made the panic attacks worse.  Patient states she was taking a whole tablet but cut down to half a tablet.   "Patient states she is currently having 1 or 2 panic attacks a week and each 1 lasts just a few minutes.  Patient reports during a panic attack she cannot breathe and feels like she is having a heart attack.  Patient reports at work she has some OCD symptoms with cleaning.  Patient denies any problems with OCD at home.  Patient states when she gets home from working her 16-hour shifts she is very irritable and on edge.  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  Patient states she is currently not sleeping good and only getting 3 to 4 hours of sleep at night.  Patient states primary care that she recently saw once her to have a sleep study done and has made a referral.  Patient states she does have a lot of nightmares occurring about every other night mainly depending on stress.  Patient states she wakes up frequently throughout the night and has trouble going back to sleep.  Patient states the nightmares are sometimes the same dream however there is occasions it will be random dreams.  Patient states where she is on the medication for weight loss her appetite is decreased however she is eating at least 2 meals a day.  Patient states she also has difficulty with focus and concentration and has struggled with this since childhood.  Patient states she was diagnosed with ADHD as a child and was on Vyvanse.  Patient states her memory is not the best and she forgets things very easily.  Patient denies any hyperactivity.  Patient denies any flashbacks.  Patient denies any fiorella or hypomania.  Patient is currently on no medications.  Patient denies any auditory or visual hallucinations.  Patient reports 2 years ago she did a lot of talking to herself in her head and \"yelling at herself in her head \".  Patient denies ever hearing another voice.  Patient states her appetite is okay and she is eating at least 2 meals a day.  Patient denies any SI or HI.  Patient states she does go " the 28th to meet with a doctor regarding breast reduction.  Patient states she goes on the 30th to her PCP and has blood work done this month.    Past Psych History:    Patient reports when she was a freshman in high school she was sent to [facility] for a week in [place] for inpatient treatment.  Patient reports she was also admitted to [facility] last year on October 9 and spent a few days.  Patient denies any psychiatrist or psychiatric provider.  Patient states she was diagnosed with ADHD as a child however her PCP prescribed her medication.  Patient states she is currently receiving therapy and received therapy when she was in school.  Patient denies any history of abuse or trauma.    Previous Psych Meds:    Patient states in the past she has tried Lexapro which did not work for her depression or anxiety.  Patient states she is also tried Vyvanse in the past and this to help with her ADHD.    Substance Abuse:   Patient denies current tobacco use.  Patient states she quit smoking about a month and a half ago.  Patient states she does drink alcohol socially but not on a regular basis.  Patient denies any illicit drug use.    Social History:   Patient states she grew up mainly with her dad due to her mom not being around a lot.  Patient reports her mom and dad  when she was very young.  Patient states her mom used to drink a lot and did not stop drinking until she was at the age of 10.  Patient states mom did not come into the picture until she was 12 or 13 years old.  Patient states dad worked a lot and many times random women were taking care of her.  Patient states her dad did remarry later on.  Patient states she has 1 adopted sister who is biologically her cousin.  Patient states she has one half sister.  Patient states she does have 3 brothers.  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  Patient is in a serious  "relationship and is engaged.  Patient has never been  and has no children.      Family Psychiatric History:  family history includes Alcohol abuse in her brother and mother; Bipolar disorder in her mother; Drug abuse in her brother; No Known Problems in her father; Schizophrenia in her mother.    Medical/Surgical History:  Past Medical History:   Diagnosis Date   • Anxiety    • Depression    • Self-injurious behavior     Cut self 10/9/18     Past Surgical History:   Procedure Laterality Date   • GASTROSCOPY     • TONSILLECTOMY      childhood       Allergies   Allergen Reactions   • Morphine Hives           Current Medications:   Current Outpatient Medications   Medication Sig Dispense Refill   • phentermine (ADIPEX-P) 37.5 MG tablet Take 37.5 mg by mouth Every Morning Before Breakfast.     • Vortioxetine HBr (TRINTELLIX) 5 MG tablet Take 5 mg by mouth Daily With Breakfast. 30 tablet 0     No current facility-administered medications for this visit.          Review of Systems   Respiratory: Negative.    Cardiovascular: Negative.    Psychiatric/Behavioral: Positive for agitation, dysphoric mood and sleep disturbance. The patient is nervous/anxious.     denies HEENT, cardiovascular, respiratory, liver, renal, GI/, endocrine, neuro, DERM, hematology, immunology, musculoskeletal disorders.    Objective   Physical Exam   Constitutional: Vital signs are normal. She appears well-developed and well-nourished. She is cooperative.   Neurological: She is alert.   Psychiatric: Her speech is normal and behavior is normal. Judgment and thought content normal. Her mood appears anxious. Cognition and memory are normal.   Vitals reviewed.    Blood pressure 110/70, pulse 88, temperature 97.7 °F (36.5 °C), temperature source Temporal, height 162.6 cm (64\"), weight 117 kg (258 lb), SpO2 97 %. Body mass index is 44.29 kg/m².      Mental Status Exam:   Hygiene:   good  Cooperation:  Cooperative  Eye Contact:  Good  Psychomotor " Behavior:  Appropriate  Affect:  Appropriate  Hopelessness: Denies  Speech:  Normal  Thought Process:  Goal directed and Linear  Thought Content:  Normal  Suicidal:  None  Homicidal:  None  Hallucinations:  None  Delusion:  None  Memory:  Intact  Orientation:  Person, Place, Time and Situation  Reliability:  fair  Insight:  Fair  Judgement:  Fair  Impulse Control:  Fair  Physical/Medical Issues:  No       Short-term goals: Patient will be compliant with clinic appointments.  Patient will be engaged in therapy, medication compliant with minimal side effects. Patient  will report decrease of symptoms and frequency.    Long-term goals: Patient will have minimal symptoms of  Anxiety, anger, and depression with continued medication management. Patient will be compliant with treatment and appointments.             Assessment/Plan   Diagnoses and all orders for this visit:    Major depressive disorder, recurrent episode, moderate (CMS/HCC)  -     Vortioxetine HBr (TRINTELLIX) 5 MG tablet; Take 5 mg by mouth Daily With Breakfast.    Generalized anxiety disorder  -     Vortioxetine HBr (TRINTELLIX) 5 MG tablet; Take 5 mg by mouth Daily With Breakfast.        Discussed medication options with patient.  Start Trintellix 5 mg.  Discussed with patient risk, benefits, and side effects of medications.  Patient acknowledged verbally consented.  Discussed with patient coping skills to use such as deep breathing when feeling very aggravated or anxious.  Discussed with patient healthy sleep hygiene and setting up nighttime routine.  Discussed with patient if having any questions, concerns, or worsening of symptoms to call the office.  Discussed with patient if having any SI or HI to call 911 or go to nearest ER.  Patient agreeable to current plan.    Follow up in four weeks.     Errors in dictation may reflect use of voice recognition software and not all errors in transcription may have been detected prior to signing.        This  document has been electronically signed by SYDNEY Reeder   August 21, 2019 1:48 PM

## 2019-08-30 ENCOUNTER — OFFICE VISIT (OUTPATIENT)
Dept: FAMILY MEDICINE CLINIC | Facility: CLINIC | Age: 19
End: 2019-08-30

## 2019-08-30 VITALS
SYSTOLIC BLOOD PRESSURE: 130 MMHG | HEIGHT: 64 IN | RESPIRATION RATE: 14 BRPM | DIASTOLIC BLOOD PRESSURE: 80 MMHG | WEIGHT: 262.2 LBS | BODY MASS INDEX: 44.76 KG/M2 | OXYGEN SATURATION: 97 % | HEART RATE: 92 BPM | TEMPERATURE: 98.4 F

## 2019-08-30 DIAGNOSIS — E78.2 ELEVATED CHOLESTEROL WITH ELEVATED TRIGLYCERIDES: ICD-10-CM

## 2019-08-30 DIAGNOSIS — M54.9 OTHER CHRONIC BACK PAIN: Primary | Chronic | ICD-10-CM

## 2019-08-30 DIAGNOSIS — G89.29 OTHER CHRONIC BACK PAIN: Primary | Chronic | ICD-10-CM

## 2019-08-30 DIAGNOSIS — L30.9 ECZEMA, UNSPECIFIED TYPE: Chronic | ICD-10-CM

## 2019-08-30 DIAGNOSIS — F43.25 ADJUSTMENT DISORDER WITH MIXED DISTURBANCE OF EMOTIONS AND CONDUCT: ICD-10-CM

## 2019-08-30 PROCEDURE — 99214 OFFICE O/P EST MOD 30 MIN: CPT | Performed by: NURSE PRACTITIONER

## 2019-08-30 RX ORDER — CETIRIZINE HYDROCHLORIDE 10 MG/1
10 TABLET ORAL DAILY
Qty: 30 TABLET | Refills: 5 | Status: SHIPPED | OUTPATIENT
Start: 2019-08-30 | End: 2019-10-09 | Stop reason: SDUPTHER

## 2019-08-30 RX ORDER — TRIAMCINOLONE ACETONIDE 1 MG/G
CREAM TOPICAL 2 TIMES DAILY
Qty: 80 G | Refills: 5 | Status: SHIPPED | OUTPATIENT
Start: 2019-08-30 | End: 2020-07-30

## 2019-08-31 LAB
ALBUMIN SERPL-MCNC: 4.6 G/DL (ref 3.5–5.2)
ALBUMIN/GLOB SERPL: 1.8 G/DL
ALP SERPL-CCNC: 85 U/L (ref 39–117)
ALT SERPL-CCNC: 16 U/L (ref 1–33)
AST SERPL-CCNC: 28 U/L (ref 1–32)
BASOPHILS # BLD AUTO: 0.07 10*3/MM3 (ref 0–0.2)
BASOPHILS NFR BLD AUTO: 0.6 % (ref 0–1.5)
BILIRUB SERPL-MCNC: <0.2 MG/DL (ref 0.2–1.2)
BUN SERPL-MCNC: 11 MG/DL (ref 6–20)
BUN/CREAT SERPL: 19 (ref 7–25)
CALCIUM SERPL-MCNC: 9.3 MG/DL (ref 8.6–10.5)
CHLORIDE SERPL-SCNC: 101 MMOL/L (ref 98–107)
CHOLEST SERPL-MCNC: 197 MG/DL (ref 0–200)
CO2 SERPL-SCNC: 25.7 MMOL/L (ref 22–29)
CREAT SERPL-MCNC: 0.58 MG/DL (ref 0.57–1)
EOSINOPHIL # BLD AUTO: 0.27 10*3/MM3 (ref 0–0.4)
EOSINOPHIL NFR BLD AUTO: 2.2 % (ref 0.3–6.2)
ERYTHROCYTE [DISTWIDTH] IN BLOOD BY AUTOMATED COUNT: 13 % (ref 12.3–15.4)
GLOBULIN SER CALC-MCNC: 2.6 GM/DL
GLUCOSE SERPL-MCNC: 87 MG/DL (ref 65–99)
HCT VFR BLD AUTO: 42.8 % (ref 34–46.6)
HDLC SERPL-MCNC: 49 MG/DL (ref 40–60)
HGB BLD-MCNC: 13.7 G/DL (ref 12–15.9)
IMM GRANULOCYTES # BLD AUTO: 0.04 10*3/MM3 (ref 0–0.05)
IMM GRANULOCYTES NFR BLD AUTO: 0.3 % (ref 0–0.5)
LDLC SERPL CALC-MCNC: 115 MG/DL (ref 0–100)
LYMPHOCYTES # BLD AUTO: 1.81 10*3/MM3 (ref 0.7–3.1)
LYMPHOCYTES NFR BLD AUTO: 14.7 % (ref 19.6–45.3)
MCH RBC QN AUTO: 28.1 PG (ref 26.6–33)
MCHC RBC AUTO-ENTMCNC: 32 G/DL (ref 31.5–35.7)
MCV RBC AUTO: 87.7 FL (ref 79–97)
MONOCYTES # BLD AUTO: 1.14 10*3/MM3 (ref 0.1–0.9)
MONOCYTES NFR BLD AUTO: 9.3 % (ref 5–12)
NEUTROPHILS # BLD AUTO: 8.96 10*3/MM3 (ref 1.7–7)
NEUTROPHILS NFR BLD AUTO: 72.9 % (ref 42.7–76)
NRBC BLD AUTO-RTO: 0 /100 WBC (ref 0–0.2)
PLATELET # BLD AUTO: 342 10*3/MM3 (ref 140–450)
POTASSIUM SERPL-SCNC: 3.9 MMOL/L (ref 3.5–5.2)
PROT SERPL-MCNC: 7.2 G/DL (ref 6–8.5)
RBC # BLD AUTO: 4.88 10*6/MM3 (ref 3.77–5.28)
SODIUM SERPL-SCNC: 136 MMOL/L (ref 136–145)
TRIGL SERPL-MCNC: 164 MG/DL (ref 0–150)
TSH SERPL DL<=0.005 MIU/L-ACNC: 2.14 UIU/ML (ref 0.27–4.2)
VLDLC SERPL CALC-MCNC: 32.8 MG/DL (ref 5–40)
WBC # BLD AUTO: 12.29 10*3/MM3 (ref 3.4–10.8)

## 2019-09-16 ENCOUNTER — TELEPHONE (OUTPATIENT)
Dept: FAMILY MEDICINE CLINIC | Facility: CLINIC | Age: 19
End: 2019-09-16

## 2019-09-16 NOTE — TELEPHONE ENCOUNTER
----- Message from SYDNEY Reeder sent at 9/16/2019  3:55 PM EDT -----  Oh ok thank you I will try to call her back     ----- Message -----  From: Delfina Casey MA  Sent: 9/16/2019   3:49 PM  To: SYDNEY Reeder    She just wanted to touch base with you about her medicine.   ----- Message -----  From: Gaby Price APRN  Sent: 9/16/2019   3:41 PM  To: Delfina Casey MA    Did she say what it was regarding   Im not sure if I will get a chance to call her back today  ----- Message -----  From: Delfina Casey MA  Sent: 9/16/2019   2:30 PM  To: SYDNEY Reeder    Patient called and wanted to know if you could call and speak to her please?

## 2019-10-02 DIAGNOSIS — F41.1 GENERALIZED ANXIETY DISORDER: ICD-10-CM

## 2019-10-02 DIAGNOSIS — F33.1 MAJOR DEPRESSIVE DISORDER, RECURRENT EPISODE, MODERATE (HCC): ICD-10-CM

## 2019-10-02 NOTE — TELEPHONE ENCOUNTER
PATIENT STATES SHE MISSED APPOINTMENT DUE TO NOT HAVING THE [employer] FOR CO PAY. SHE IS SCHEDULED TO SEE YOU 10/23 AND WANTED TO KNOW IF YOU COULD SEND IN REFILLS OF TRINTELLIX. SAID MEDS ARE WORKING FINE, SHE JUST HASN'T BEEN ABLE TO GET HERE

## 2019-10-09 ENCOUNTER — OFFICE VISIT (OUTPATIENT)
Dept: FAMILY MEDICINE CLINIC | Facility: CLINIC | Age: 19
End: 2019-10-09

## 2019-10-09 VITALS
DIASTOLIC BLOOD PRESSURE: 80 MMHG | BODY MASS INDEX: 44.22 KG/M2 | OXYGEN SATURATION: 98 % | TEMPERATURE: 98 F | HEART RATE: 78 BPM | SYSTOLIC BLOOD PRESSURE: 110 MMHG | WEIGHT: 259 LBS | RESPIRATION RATE: 14 BRPM | HEIGHT: 64 IN

## 2019-10-09 DIAGNOSIS — B00.1 HERPES LABIALIS WITHOUT COMPLICATION: Primary | ICD-10-CM

## 2019-10-09 DIAGNOSIS — L30.9 ECZEMA, UNSPECIFIED TYPE: Chronic | ICD-10-CM

## 2019-10-09 PROCEDURE — 99214 OFFICE O/P EST MOD 30 MIN: CPT | Performed by: NURSE PRACTITIONER

## 2019-10-09 RX ORDER — CETIRIZINE HYDROCHLORIDE 10 MG/1
10 TABLET ORAL DAILY
Qty: 30 TABLET | Refills: 5 | Status: SHIPPED | OUTPATIENT
Start: 2019-10-09 | End: 2020-07-30

## 2019-10-09 RX ORDER — VALACYCLOVIR HYDROCHLORIDE 1 G/1
2000 TABLET, FILM COATED ORAL 2 TIMES DAILY
Qty: 12 TABLET | Refills: 1 | Status: SHIPPED | OUTPATIENT
Start: 2019-10-09 | End: 2019-10-30 | Stop reason: SDUPTHER

## 2019-10-09 NOTE — PROGRESS NOTES
Angeline Zamudio is a 18 y.o. female who presents to the clinic today c/o blisters on her lips which started two weeks ago. Associated symptoms include itchiness, dry and sore. She has tried several otc medications/creams without adequate relief.     Mouth Lesions    The current episode started more than 1 week ago. The onset was sudden. The problem occurs continuously. The problem has been gradually worsening. The problem is moderate. Nothing relieves the symptoms. Nothing aggravates the symptoms. Associated symptoms include mouth sores. Pertinent negatives include no fever, no decreased vision, no eye itching, no nausea, no vomiting, no congestion, no headaches, no rhinorrhea, no sore throat, no swollen glands, no cough, no wheezing, no rash, no eye discharge and no eye redness.      Refer to ROS for additional information.  The following portions of the patient's history were reviewed and updated as appropriate: allergies, current medications, past family history, past medical history, past social history, past surgical history and problem list.    Current Outpatient Medications:   •  cetirizine (zyrTEC) 10 MG tablet, Take 1 tablet by mouth Daily., Disp: 30 tablet, Rfl: 5  •  phentermine (ADIPEX-P) 37.5 MG tablet, Take 37.5 mg by mouth Every Morning Before Breakfast., Disp: , Rfl:   •  Vortioxetine HBr (TRINTELLIX) 5 MG tablet, Take 5 mg by mouth Daily With Breakfast., Disp: 30 tablet, Rfl: 0  •  triamcinolone (KENALOG) 0.1 % cream, Apply  topically to the appropriate area as directed 2 (Two) Times a Day., Disp: 80 g, Rfl: 5  •  valACYclovir (VALTREX) 1000 MG tablet, Take 2 tablets by mouth 2 (Two) Times a Day., Disp: 12 tablet, Rfl: 1    Allergies   Allergen Reactions   • Morphine Hives     Review of Systems   Constitutional: Negative for activity change, appetite change, chills, fatigue and fever.   HENT: Positive for mouth sores. Negative for congestion, rhinorrhea, sinus pressure and sore throat.    Eyes:  "Negative for discharge, redness and itching.   Respiratory: Negative for cough, shortness of breath and wheezing.    Cardiovascular: Negative for chest pain.   Gastrointestinal: Negative for nausea and vomiting.   Skin: Negative for color change and rash.   Neurological: Negative for headaches.   Hematological: Negative for adenopathy.     Visit Vitals  /80 (BP Location: Right arm, Patient Position: Sitting, Cuff Size: Adult)   Pulse 78   Temp 98 °F (36.7 °C) (Temporal)   Resp 14   Ht 162.6 cm (64\")   Wt 117 kg (259 lb)   LMP 09/25/2019   SpO2 98%   BMI 44.46 kg/m²     Physical Exam   Constitutional: She is oriented to person, place, and time. She appears well-developed and well-nourished. No distress.   HENT:   Head: Normocephalic.   Right Ear: Tympanic membrane and ear canal normal.   Left Ear: Tympanic membrane and ear canal normal.   Nose: Nose normal.   Mouth/Throat: Oropharynx is clear and moist and mucous membranes are normal. No oropharyngeal exudate.       Eyes: Conjunctivae are normal. Pupils are equal, round, and reactive to light. Right eye exhibits no discharge. Left eye exhibits no discharge. No scleral icterus.   Neck: Neck supple. No tracheal tenderness present.   Cardiovascular: Normal rate, regular rhythm and normal heart sounds. Exam reveals no friction rub.   No murmur heard.  Pulmonary/Chest: Effort normal and breath sounds normal. No respiratory distress. She has no wheezes. She has no rales.   Musculoskeletal: She exhibits no edema.   Lymphadenopathy:     She has no cervical adenopathy.   Neurological: She is alert and oriented to person, place, and time.   Skin: Skin is warm and dry. Capillary refill takes less than 2 seconds. No rash noted. No erythema.   Vitals reviewed.    Assessment/Plan   Diagnoses and all orders for this visit:    Herpes labialis without complication  Comments:   Counseled regarding supportive care measures and infection control measures.  Orders:  -     " valACYclovir (VALTREX) 1000 MG tablet; Take 2 tablets by mouth 2 (Two) Times a Day.    Eczema, unspecified type  Comments:  Symptoms are typical for eczema and discussed treatment options and supportive care measures.   Orders:  -     cetirizine (zyrTEC) 10 MG tablet; Take 1 tablet by mouth Daily.      Findings and recommendations discussed with Angeline. Treatment options reviewed. Counseled regarding supportive care measures and infection control measures. She is requesting refills on Zyrtec which she takes for her allergies and eczema. S/S of concern reviewed and if occur to seek further medical evaluation. She will f/u with me in February. Encouraged her to f/u with Behavioral Health.        This document has been electronically signed by SYDNEY Arnold, BENTON-BC, ALEKS

## 2019-10-30 DIAGNOSIS — B00.1 HERPES LABIALIS WITHOUT COMPLICATION: ICD-10-CM

## 2019-10-30 RX ORDER — VALACYCLOVIR HYDROCHLORIDE 1 G/1
2000 TABLET, FILM COATED ORAL 2 TIMES DAILY
Qty: 12 TABLET | Refills: 1 | Status: SHIPPED | OUTPATIENT
Start: 2019-10-30 | End: 2020-02-07 | Stop reason: SDUPTHER

## 2019-12-26 DIAGNOSIS — B35.9 RINGWORM: Primary | ICD-10-CM

## 2019-12-26 RX ORDER — KETOCONAZOLE 20 MG/G
CREAM TOPICAL EVERY 12 HOURS SCHEDULED
Qty: 60 G | Refills: 0 | Status: SHIPPED | OUTPATIENT
Start: 2019-12-26 | End: 2020-07-30

## 2020-02-07 DIAGNOSIS — B00.1 HERPES LABIALIS WITHOUT COMPLICATION: ICD-10-CM

## 2020-02-07 RX ORDER — VALACYCLOVIR HYDROCHLORIDE 1 G/1
2000 TABLET, FILM COATED ORAL 2 TIMES DAILY
Qty: 12 TABLET | Refills: 1 | Status: SHIPPED | OUTPATIENT
Start: 2020-02-07 | End: 2020-07-30

## 2020-07-30 ENCOUNTER — HOSPITAL ENCOUNTER (OUTPATIENT)
Dept: GENERAL RADIOLOGY | Facility: HOSPITAL | Age: 20
Discharge: HOME | End: 2020-07-30
Admitting: NURSE PRACTITIONER

## 2020-07-30 ENCOUNTER — OFFICE VISIT (OUTPATIENT)
Dept: FAMILY MEDICINE CLINIC | Facility: CLINIC | Age: 20
End: 2020-07-30

## 2020-07-30 VITALS — WEIGHT: 285 LBS | HEIGHT: 64 IN | BODY MASS INDEX: 48.65 KG/M2

## 2020-07-30 DIAGNOSIS — M25.571 ACUTE RIGHT ANKLE PAIN: Primary | ICD-10-CM

## 2020-07-30 DIAGNOSIS — E78.2 ELEVATED CHOLESTEROL WITH ELEVATED TRIGLYCERIDES: ICD-10-CM

## 2020-07-30 DIAGNOSIS — M25.571 ACUTE RIGHT ANKLE PAIN: ICD-10-CM

## 2020-07-30 PROBLEM — E78.5 DYSLIPIDEMIA: Status: ACTIVE | Noted: 2020-07-30

## 2020-07-30 PROCEDURE — 73630 X-RAY EXAM OF FOOT: CPT | Performed by: RADIOLOGY

## 2020-07-30 PROCEDURE — 73630 X-RAY EXAM OF FOOT: CPT

## 2020-07-30 PROCEDURE — 99442 PR PHYS/QHP TELEPHONE EVALUATION 11-20 MIN: CPT | Performed by: NURSE PRACTITIONER

## 2020-07-30 PROCEDURE — 73610 X-RAY EXAM OF ANKLE: CPT | Performed by: RADIOLOGY

## 2020-07-30 PROCEDURE — 73610 X-RAY EXAM OF ANKLE: CPT

## 2020-07-30 RX ORDER — IBUPROFEN 800 MG/1
800 TABLET ORAL EVERY 6 HOURS PRN
Qty: 30 TABLET | Refills: 1 | Status: SHIPPED | OUTPATIENT
Start: 2020-07-30

## 2020-07-30 NOTE — PROGRESS NOTES
You have chosen to receive care through a telephone visit. Do you consent to use a telephone visit for your medical care today? Yes    History of Present Illness   Angeline Zamudio is a 19 y.o. female with c/o right ankle edema and swelling which started appx one month ago. In addition, she is concerned about her weight gain. She was taking Adipex and had lost significant pounds but has regained the weight and even more.     Ankle Pain     The incident occurred more than 1 week ago (Several months). There was no injury mechanism. The pain is present in the right ankle. The quality of the pain is described as aching. The pain is moderate. Pain course: Daily.  Associated symptoms include a loss of motion and muscle weakness. Pertinent negatives include no inability to bear weight, numbness or tingling. The symptoms are aggravated by weight bearing, movement and palpation. She has tried elevation and ice (ACE bandage) for the symptoms.  She has fractured her right foot while in Middle School which was not treated.      Obesity    The current episode started more than 1 year ago. The problem has been gradually worsening. Associated symptoms include arthralgias and joint swelling. Treatments tried: Adipex; Has changed from regular soda to water only.      The following portions of the patient's history were reviewed and updated as appropriate: allergies, current medications, past family history, past medical history, past social history, past surgical history and problem list.  No current outpatient medications on file.    Allergies   Allergen Reactions   • Morphine Hives     Review of Systems   Constitutional: Positive for activity change and appetite change. Negative for chills, fatigue and fever.   Respiratory: Negative for cough, shortness of breath and wheezing.    Cardiovascular: Negative for chest pain.   Gastrointestinal: Negative for anorexia, nausea and vomiting.   Musculoskeletal: Positive for arthralgias,  "gait problem and joint swelling.   Skin: Negative for color change and rash.   Neurological: Negative for tingling and numbness.   Hematological: Negative for adenopathy.     Visit Vitals  Ht 162.6 cm (64\")   Wt 129 kg (285 lb)   BMI 48.92 kg/m²   Pt reported    Physical Exam   Constitutional: She is oriented to person, place, and time.   Neurological: She is alert and oriented to person, place, and time.   Psychiatric: She has a normal mood and affect. Her speech is normal. Thought content normal.     Assessment/Plan   Diagnoses and all orders for this visit:    Acute right ankle pain  Comments:  Symptoms discussed and treatment options reviewed. X rays ordered to evaluate her right ankle and foot. Results will be discussed with her when available.  Orders:  -     XR Ankle 3+ View Right; Future  -     XR Foot 3+ View Right; Future  -     Vitamin D 25 Hydroxy; Future  -     Vitamin B12; Future  -     Uric Acid; Future    BMI 45.0-49.9, adult (CMS/[facility])  Comments:  Referral to Bariatric Surgery was made.   Orders:  -     Ambulatory Referral to Bariatric Surgery  -     TSH; Future  -     CBC & Differential; Future    Elevated cholesterol with elevated triglycerides  Comments:  Lipid panel ordered  Orders:  -     Comprehensive Metabolic Panel; Future  -     Lipid Panel; Future    Symptoms discussed and treatment options reviewed. X rays ordered to evaluate her right ankle and foot. Results will be discussed with her when available. She is taking Ibuprofen 600 mg which helps mildly. Referral to Bariatric Surgery was made. She is due for routine labs which will be ordered for her to have on August 6th and f/u with me on August 12 th.  This visit has been scheduled as a phone visit to comply with patient safety concerns in accordance with CDC recommendations. Total time of discussion was 20 minutes.       This document has been electronically signed by SYDNEY Arnold, BENTON-BC, HUGOE  July 30, 2020 09:34     "

## 2020-08-12 ENCOUNTER — OFFICE VISIT (OUTPATIENT)
Dept: FAMILY MEDICINE CLINIC | Facility: CLINIC | Age: 20
End: 2020-08-12

## 2020-08-12 DIAGNOSIS — E78.5 DYSLIPIDEMIA: Chronic | ICD-10-CM

## 2020-08-12 DIAGNOSIS — M25.571 RIGHT ANKLE PAIN, UNSPECIFIED CHRONICITY: ICD-10-CM

## 2020-08-12 DIAGNOSIS — G89.29 OTHER CHRONIC BACK PAIN: Primary | Chronic | ICD-10-CM

## 2020-08-12 DIAGNOSIS — M54.9 OTHER CHRONIC BACK PAIN: Primary | Chronic | ICD-10-CM

## 2020-08-12 PROCEDURE — 99442 PR PHYS/QHP TELEPHONE EVALUATION 11-20 MIN: CPT | Performed by: NURSE PRACTITIONER

## 2020-08-12 NOTE — PROGRESS NOTES
You have chosen to receive care through a telephone visit. Do you consent to use a telephone visit for your medical care today? Yes    History of Present Illness   Angeline Zamudio is a 19 y.o. female with c/o continued right ankle pain which started appx one month ago. In addition, she continues to have back pain.     Ankle Pain    The incident occurred more than 1 week ago (Several months). There was no injury mechanism. The pain is present in the right ankle. The quality of the pain is described as aching. The pain is mild. Pain course: Daily.  Pertinent negatives include no inability to bear weight, numbness or tingling. The symptoms are aggravated by weight bearing and movement. She has tried rest and ACE bandage which has helped.  She has fractured her right foot while in Middle School which was not treated. She did have an xray of her right ankle which was unremarkable.     Back Pain   The current episode started more than 1 month ago. The problem has been waxing and waning since onset. The pain is present in the lumbar spine and thoracic spine. The quality of the pain is described as aching. The pain does not radiate. The pain is worse during the day. The symptoms are aggravated by position. Pertinent negatives include no bladder incontinence, bowel incontinence, leg pain, numbness, paresis, paresthesias, perianal numbness or tingling. Risk factors include obesity. She has tried NSAIDs for the symptoms. The treatment provided mild relief.      Obesity    The current episode started more than 1 year ago. The problem has been gradually worsening. Associated symptoms include arthralgias and joint swelling. Treatments tried: Adipex; Has changed from regular soda to water only.      There were no vitals filed for this visit.     The following portions of the patient's history were reviewed and updated as appropriate: allergies, current medications, past family history, past medical history, past social history,  past surgical history and problem list.    Review of Systems   Constitutional: Positive for fatigue. Negative for activity change, appetite change, chills and fever.   Eyes: Negative for visual disturbance.   Respiratory: Negative for cough, shortness of breath and wheezing.    Cardiovascular: Negative for chest pain, palpitations and leg swelling.   Gastrointestinal: Negative for bowel incontinence, nausea and vomiting.   Genitourinary: Negative for bladder incontinence, decreased urine volume, difficulty urinating, dysuria, flank pain and frequency.   Musculoskeletal: Positive for arthralgias and back pain.   Skin: Negative for color change and rash.   Neurological: Negative for dizziness, tingling, numbness, headaches and paresthesias.   Hematological: Negative for adenopathy.     Physical Exam   Constitutional: She is oriented to person, place, and time.   Pleasant   Neurological: She is alert and oriented to person, place, and time.   Psychiatric: She has a normal mood and affect. Her behavior is normal.     Assessment/Plan     Problems Addressed this Visit        Digestive    BMI 45.0-49.9, adult (CMS/[facility])       Nervous and Auditory    Chronic back pain - Primary    Relevant Orders    XR Spine Lumbar 2 or 3 View    XR Spine Thoracic 2 View    Ambulatory Referral to Physical Therapy Evaluate and treat (Completed)       Other    Dyslipidemia      Other Visit Diagnoses     Right ankle pain, unspecified chronicity        PT will be ordered    Relevant Orders    Ambulatory Referral to Physical Therapy Evaluate and treat (Completed)      Symptoms discussed with Angeline. Will order x rays of her back prior to ordering Physical Therapy. Her right ankle has improved but has continued to be painful. Will order PT for both her ankle and her back. She was unable to have labs done prior to this visit. These have been rescheduled and her f/u appointment will be on August 20 th at 9:00 am; sooner if problems/concerns occur.      This visit has been scheduled as a phone visit to comply with patient safety concerns in accordance with CDC recommendations. Total time of discussion was 20 minutes.       This document has been electronically signed by SYDNEY Arnold, BENTON-BC, ALEKS  August 12, 2020 17:34

## 2020-08-19 ENCOUNTER — TELEPHONE (OUTPATIENT)
Dept: FAMILY MEDICINE CLINIC | Facility: CLINIC | Age: 20
End: 2020-08-19

## 2020-08-19 NOTE — TELEPHONE ENCOUNTER
Called pt regarding providers message. Pt vu instructions and will go to [facility] to be evaluated. If pt decides to go to [facility] she will notify us.

## 2020-08-19 NOTE — TELEPHONE ENCOUNTER
Pt called to verify telephone appt tomorrow. Informed pt of time. Pt stated she was supposed to have labs done to check for gout. Labs are still active from 7/2020. Pt is currently having chest pressure, SOA, congestion and occasional blood in sputum. Denies fever, dyspnea, dizziness, or near syncope. Symptoms started yesterday. Informed pt I would send a message to her provider and to wait to have labs completed in case the provider may want to add additional testing. Pt vu. Please advise.

## 2020-08-20 ENCOUNTER — OFFICE VISIT (OUTPATIENT)
Dept: FAMILY MEDICINE CLINIC | Facility: CLINIC | Age: 20
End: 2020-08-20

## 2020-08-20 VITALS — HEIGHT: 64 IN | WEIGHT: 288 LBS | BODY MASS INDEX: 49.17 KG/M2

## 2020-08-20 DIAGNOSIS — E78.5 DYSLIPIDEMIA: Chronic | ICD-10-CM

## 2020-08-20 DIAGNOSIS — R05.9 COUGH: Primary | ICD-10-CM

## 2020-08-20 DIAGNOSIS — M25.571 CHRONIC PAIN OF RIGHT ANKLE: Chronic | ICD-10-CM

## 2020-08-20 DIAGNOSIS — G89.29 CHRONIC PAIN OF RIGHT ANKLE: Chronic | ICD-10-CM

## 2020-08-20 DIAGNOSIS — G89.29 OTHER CHRONIC BACK PAIN: Chronic | ICD-10-CM

## 2020-08-20 DIAGNOSIS — M54.9 OTHER CHRONIC BACK PAIN: Chronic | ICD-10-CM

## 2020-08-20 PROCEDURE — 99443 PR PHYS/QHP TELEPHONE EVALUATION 21-30 MIN: CPT | Performed by: NURSE PRACTITIONER

## 2020-08-20 RX ORDER — ALBUTEROL SULFATE 90 UG/1
2 AEROSOL, METERED RESPIRATORY (INHALATION) EVERY 6 HOURS PRN
Qty: 18 G | Refills: 1 | Status: SHIPPED | OUTPATIENT
Start: 2020-08-20

## 2020-08-20 NOTE — PROGRESS NOTES
You have chosen to receive care through a telephone visit. Do you consent to use a telephone visit for your medical care today? Yes    Angeline Zamudio is a 19 y.o. female who is c/o shortness of air which started after she started smoking again. She has previously used an rescue inhaler but denies diagnosis of asthma or COPD. She denies exposure to COVID.   In addition, Angeline has Ankle and Back Pain which she has been referred to Physical Therapy. Her appointment has been scheduled for next Thursday.      History of Present Illness     Cough   The current episode started in the past 7 days. The problem occurs intermittently. The problem has been waxing and waning. Associated symptoms include occasional cough and  sputum production (Small amount). Pertinent negatives include no chest pain, claudication, ear pain, fever, headaches, hemoptysis, leg pain, leg swelling, orthopnea, PND, rash, rhinorrhea, sore throat, swollen glands, syncope, vomiting or wheezing. The symptoms are aggravated by smoke and exercise. She has tried rest for the symptoms. The treatment provided mild relief. Her past medical history is significant for allergies. There is no history of aspirin allergies, chronic lung disease, COPD, DVT, a heart failure, PE, pneumonia or a recent surgery.     Ankle Pain    The incident occurred more than 1 week ago (Several months). There was no injury mechanism. The pain is present in the right ankle. The quality of the pain is described as aching. The pain is mild. Pain course: Daily.  Pertinent negatives include no inability to bear weight, numbness or tingling. The symptoms are aggravated by weight bearing and movement. She has tried rest and ACE bandage which has helped.  She has fractured her right foot while in Middle School which was not treated. She did have an xray of her right ankle which was unremarkable.     Back Pain   The current episode started more than 1 month ago. The problem has been waxing  and waning since onset. The pain is present in the lumbar spine and thoracic spine. The quality of the pain is described as aching. The pain does not radiate. The pain is worse during the day. The symptoms are aggravated by position. Pertinent negatives include no bladder incontinence, bowel incontinence, leg pain, numbness, paresis, paresthesias, perianal numbness or tingling. Risk factors include obesity. She has tried NSAIDs for the symptoms. The treatment provided mild relief.      Obesity    The current episode started more than 1 year ago. The problem has been gradually worsening. Associated symptoms include arthralgias and joint swelling. Treatments tried: Adipex which was helpful but when she quit taking them she gain her weight back and even more.  Has changed from regular soda to water only. Referral to Bariatric Surgery has been made which she is interested in having done.      The following portions of the patient's history were reviewed and updated as appropriate: allergies, current medications, past family history, past medical history, past social history, past surgical history and problem list.    Current Outpatient Medications:   •  albuterol sulfate  (90 Base) MCG/ACT inhaler, Inhale 2 puffs Every 6 (Six) Hours As Needed for Wheezing or Shortness of Air., Disp: 18 g, Rfl: 1  •  ibuprofen (ADVIL,MOTRIN) 800 MG tablet, Take 1 tablet by mouth Every 6 (Six) Hours As Needed for Mild Pain  or Moderate Pain  for up to 30 doses. With food, Disp: 30 tablet, Rfl: 1    Allergies   Allergen Reactions   • Morphine Hives     Review of Systems   Constitutional: Negative for activity change, appetite change, fatigue and fever.   HENT: Negative for congestion, ear pain, postnasal drip, rhinorrhea, sinus pressure, sinus pain, sneezing, sore throat and trouble swallowing.    Eyes: Negative for discharge and redness.   Respiratory: Positive for cough (Occassional ), sputum production (Small amount) and  "shortness of breath. Negative for hemoptysis, chest tightness and wheezing.    Cardiovascular: Negative for chest pain, palpitations, orthopnea, claudication, leg swelling, syncope and PND.   Gastrointestinal: Negative for diarrhea, nausea and vomiting.   Musculoskeletal: Positive for arthralgias and back pain. Negative for joint swelling and myalgias.   Skin: Negative for color change and rash.   Neurological: Negative for dizziness, speech difficulty, weakness, light-headedness and headaches.   Hematological: Negative for adenopathy.   All other systems reviewed and are negative.    Visit Vitals  Ht 162.6 cm (64\")   Wt 131 kg (288 lb) Comment: taken at home   LMP 08/08/2020   Breastfeeding No   BMI 49.44 kg/m²     Physical Exam   Constitutional: She is oriented to person, place, and time.   Pleasant; in good spirits. No shortness of air noted with speaking. No distress   Neurological: She is alert and oriented to person, place, and time.   Psychiatric: She has a normal mood and affect. Her speech is normal and behavior is normal.     Assessment/Plan   Diagnoses and all orders for this visit:    Cough  Comments:  Symptoms discussed and treatment options. Counseled regarding supportive care measures.  Orders:  -     albuterol sulfate  (90 Base) MCG/ACT inhaler; Inhale 2 puffs Every 6 (Six) Hours As Needed for Wheezing or Shortness of Air.    Dyslipidemia  Comments:  Lipid panel ordered and scheduled for next week    Other chronic back pain  Comments:  Encouraged her to keep her appointment with Physical Therapy next week.    Chronic pain of right ankle  Comments:  Encouraged her to keep her appointment with Physical Therapy next week.    BMI 45.0-49.9, adult (CMS/[facility])  Comments:  Will check regarding status of Bariatric Surgical referral but encouraged her to continue to focus on lifestyle modifications including nutrition and activity.    Symptoms discussed and treatment options. Counseled regarding " supportive care measures. S/S pf concern reviewed and if occur to seek further medical evaluation.  Encouraged her to keep her appointment with Physical Therapy next week. Will check regarding status of Bariatric Surgical referral but encouraged her to continue to focus on lifestyle modifications including nutrition and activity. She will have labs next week and f/u with me in September; sooner if problems/concerns occur.   This visit has been scheduled as a phone visit to comply with patient safety concerns in accordance with CDC recommendations. Total time of discussion was 22  minutes.      This document has been electronically signed by SYDNEY Arnold, BENTON-BC, ALEKS  August 20, 2020 10:16

## 2023-03-31 LAB
C TRACH RRNA SPEC DONR QL NAA+PROBE: NEGATIVE
EXTERNAL HEMATOCRIT: 41 %
EXTERNAL HEMOGLOBIN: 13.7 G/DL
EXTERNAL PLATELET COUNT: 384 K/ΜL
EXTERNAL SYPHILIS RPR SCREEN: NORMAL
GLUCOSE UR STRIP-MCNC: NEGATIVE MG/DL
HCV AB S/CO SERPL IA: NEGATIVE
HIV 1+2 AB+HIV1 P24 AG SERPL QL IA: NON REACTIVE
N GONORRHOEA DNA SPEC QL NAA+PROBE: NEGATIVE
PROT UR STRIP-MCNC: NEGATIVE MG/DL
RUBV IGG SERPL IA-ACNC: 5.52

## 2023-04-13 ENCOUNTER — INITIAL PRENATAL (OUTPATIENT)
Dept: OBSTETRICS AND GYNECOLOGY | Facility: CLINIC | Age: 23
End: 2023-04-13
Payer: COMMERCIAL

## 2023-04-13 VITALS — DIASTOLIC BLOOD PRESSURE: 79 MMHG | WEIGHT: 259 LBS | BODY MASS INDEX: 44.46 KG/M2 | SYSTOLIC BLOOD PRESSURE: 124 MMHG

## 2023-04-13 DIAGNOSIS — Z34.80 SUPERVISION OF OTHER NORMAL PREGNANCY, ANTEPARTUM: Primary | ICD-10-CM

## 2023-04-13 DIAGNOSIS — O99.210 MATERNAL OBESITY AFFECTING PREGNANCY, ANTEPARTUM: ICD-10-CM

## 2023-04-13 LAB
EXTERNAL ABO GROUPING: NORMAL
GLUCOSE UR STRIP-MCNC: NEGATIVE MG/DL
PROT UR STRIP-MCNC: NEGATIVE MG/DL

## 2023-04-13 NOTE — PROGRESS NOTES
OB FOLLOW UP        Chief Complaint   Patient presents with   • Initial Prenatal Visit       Subjective:   • No complaints  • Patient received initial care at [facility], records in chart.    Objective:  /79   Wt 117 kg (259 lb)   LMP 12/17/2022   BMI 44.46 kg/m²   Uterine Size: size equals dates, below umbilicus  FHT: 110-160 BPM    See OB flow for LE edema, cvx exam if performed, and Upro/Uglu    Assessment and Plan:  16w5d  Reassuring pregnancy progress.  Questions answered.  Diagnoses and all orders for this visit:    1. Supervision of other normal pregnancy, antepartum (Primary)  Overview:  CATHY finalized: 9/23/23      Genetic testing (NIPS-Quad)/CF/AFP:  MaterniT 21 negative, AFP ordered    COVID: Recommended 4/13/23  Flu: Recommended 4/13/23  Tdap:    Rhogam:  ? Desires Sterilization:    Anatomy US:  FU US:    PROBLEM LIST/PLAN:   Maternal obesity - early 1hGTT 62         Orders:  -     POC Urinalysis Dipstick  -     Hemoglobinopathy Fractionation Cascade  -     Alpha Fetoprotein, Maternal  -     Hepatitis B Surface Antigen  -     US Ob Detail Fetal Anatomy Single or First Gestation; Future  -     Alpha Fetoprotein, Maternal  -     Hemoglobinopathy Fractionation Cascade  -     Hepatitis B Surface Antigen    2. Maternal obesity affecting pregnancy, antepartum  Overview:  Early 1hGTT 62      Counseling:    • Second trimester precautions  • Continue PNV.  Importance of healthy eating and exercise.    Return in about 4 weeks (around 5/11/2023) for [facility], OB follow up.        Chapincito Stephens, APRN  04/13/2023    [facility]  [facility]  [address] [address]  Dept: [phone]  Loc: [phone]

## 2023-04-25 ENCOUNTER — REFERRAL TRIAGE (OUTPATIENT)
Dept: LABOR AND DELIVERY | Facility: HOSPITAL | Age: 23
End: 2023-04-25
Payer: COMMERCIAL

## 2024-07-16 ENCOUNTER — HOSPITAL ENCOUNTER (EMERGENCY)
Facility: HOSPITAL | Age: 24
Discharge: HOME | End: 2024-07-16
Attending: EMERGENCY MEDICINE
Payer: COMMERCIAL

## 2024-07-16 VITALS
HEIGHT: 64 IN | DIASTOLIC BLOOD PRESSURE: 82 MMHG | RESPIRATION RATE: 16 BRPM | BODY MASS INDEX: 47.5 KG/M2 | TEMPERATURE: 98.5 F | SYSTOLIC BLOOD PRESSURE: 129 MMHG | HEART RATE: 81 BPM | OXYGEN SATURATION: 99 % | WEIGHT: 278.22 LBS

## 2024-07-16 DIAGNOSIS — N39.0 ACUTE UTI: Primary | ICD-10-CM

## 2024-07-16 LAB
ALBUMIN SERPL-MCNC: 3.4 G/DL (ref 3.5–5.2)
ALBUMIN/GLOB SERPL: 1.2 G/DL
ALP SERPL-CCNC: 72 U/L (ref 39–117)
ALT SERPL W P-5'-P-CCNC: 6 U/L (ref 1–33)
ANION GAP SERPL CALCULATED.3IONS-SCNC: 9.2 MMOL/L (ref 5–15)
AST SERPL-CCNC: 15 U/L (ref 1–32)
BACTERIA UR QL AUTO: ABNORMAL /HPF
BASOPHILS # BLD AUTO: 0.03 10*3/MM3 (ref 0–0.2)
BASOPHILS NFR BLD AUTO: 0.2 % (ref 0–1.5)
BILIRUB SERPL-MCNC: 0.2 MG/DL (ref 0–1.2)
BILIRUB UR QL STRIP: NEGATIVE
BUN SERPL-MCNC: 4 MG/DL (ref 6–20)
BUN/CREAT SERPL: 8.5 (ref 7–25)
CALCIUM SPEC-SCNC: 8.6 MG/DL (ref 8.6–10.5)
CHLORIDE SERPL-SCNC: 105 MMOL/L (ref 98–107)
CLARITY UR: CLEAR
CO2 SERPL-SCNC: 21.8 MMOL/L (ref 22–29)
COLOR UR: YELLOW
CREAT SERPL-MCNC: 0.47 MG/DL (ref 0.57–1)
DEPRECATED RDW RBC AUTO: 42.8 FL (ref 37–54)
EGFRCR SERPLBLD CKD-EPI 2021: 135.7 ML/MIN/1.73
EOSINOPHIL # BLD AUTO: 0.07 10*3/MM3 (ref 0–0.4)
EOSINOPHIL NFR BLD AUTO: 0.5 % (ref 0.3–6.2)
ERYTHROCYTE [DISTWIDTH] IN BLOOD BY AUTOMATED COUNT: 13.8 % (ref 12.3–15.4)
GLOBULIN UR ELPH-MCNC: 2.9 GM/DL
GLUCOSE SERPL-MCNC: 73 MG/DL (ref 65–99)
GLUCOSE UR STRIP-MCNC: NEGATIVE MG/DL
HCT VFR BLD AUTO: 34.2 % (ref 34–46.6)
HGB BLD-MCNC: 11.5 G/DL (ref 12–15.9)
HGB UR QL STRIP.AUTO: NEGATIVE
HOLD SPECIMEN: NORMAL
HYALINE CASTS UR QL AUTO: ABNORMAL /LPF
IMM GRANULOCYTES # BLD AUTO: 0.07 10*3/MM3 (ref 0–0.05)
IMM GRANULOCYTES NFR BLD AUTO: 0.5 % (ref 0–0.5)
KETONES UR QL STRIP: ABNORMAL
LEUKOCYTE ESTERASE UR QL STRIP.AUTO: ABNORMAL
LYMPHOCYTES # BLD AUTO: 1.25 10*3/MM3 (ref 0.7–3.1)
LYMPHOCYTES NFR BLD AUTO: 9.2 % (ref 19.6–45.3)
MCH RBC QN AUTO: 28.8 PG (ref 26.6–33)
MCHC RBC AUTO-ENTMCNC: 33.6 G/DL (ref 31.5–35.7)
MCV RBC AUTO: 85.7 FL (ref 79–97)
MONOCYTES # BLD AUTO: 0.84 10*3/MM3 (ref 0.1–0.9)
MONOCYTES NFR BLD AUTO: 6.2 % (ref 5–12)
NEUTROPHILS NFR BLD AUTO: 11.27 10*3/MM3 (ref 1.7–7)
NEUTROPHILS NFR BLD AUTO: 83.4 % (ref 42.7–76)
NITRITE UR QL STRIP: NEGATIVE
NRBC BLD AUTO-RTO: 0 /100 WBC (ref 0–0.2)
PH UR STRIP.AUTO: 5.5 [PH] (ref 5–8)
PLATELET # BLD AUTO: 240 10*3/MM3 (ref 140–450)
PMV BLD AUTO: 9.9 FL (ref 6–12)
POTASSIUM SERPL-SCNC: 3.9 MMOL/L (ref 3.5–5.2)
PROT SERPL-MCNC: 6.3 G/DL (ref 6–8.5)
PROT UR QL STRIP: NEGATIVE
QT INTERVAL: 388 MS
QTC INTERVAL: 433 MS
RBC # BLD AUTO: 3.99 10*6/MM3 (ref 3.77–5.28)
RBC # UR STRIP: ABNORMAL /HPF
REF LAB TEST METHOD: ABNORMAL
SODIUM SERPL-SCNC: 136 MMOL/L (ref 136–145)
SP GR UR STRIP: 1.02 (ref 1–1.03)
SQUAMOUS #/AREA URNS HPF: ABNORMAL /HPF
TROPONIN T SERPL HS-MCNC: <6 NG/L
UROBILINOGEN UR QL STRIP: ABNORMAL
WBC # UR STRIP: ABNORMAL /HPF
WBC NRBC COR # BLD AUTO: 13.53 10*3/MM3 (ref 3.4–10.8)

## 2024-07-16 PROCEDURE — 80053 COMPREHEN METABOLIC PANEL: CPT | Performed by: EMERGENCY MEDICINE

## 2024-07-16 PROCEDURE — 93005 ELECTROCARDIOGRAM TRACING: CPT | Performed by: EMERGENCY MEDICINE

## 2024-07-16 PROCEDURE — 96365 THER/PROPH/DIAG IV INF INIT: CPT

## 2024-07-16 PROCEDURE — 81001 URINALYSIS AUTO W/SCOPE: CPT | Performed by: EMERGENCY MEDICINE

## 2024-07-16 PROCEDURE — 25010000002 CEFTRIAXONE PER 250 MG: Performed by: EMERGENCY MEDICINE

## 2024-07-16 PROCEDURE — 99283 EMERGENCY DEPT VISIT LOW MDM: CPT

## 2024-07-16 PROCEDURE — 84484 ASSAY OF TROPONIN QUANT: CPT | Performed by: EMERGENCY MEDICINE

## 2024-07-16 PROCEDURE — 85025 COMPLETE CBC W/AUTO DIFF WBC: CPT | Performed by: EMERGENCY MEDICINE

## 2024-07-16 RX ORDER — NITROFURANTOIN 25; 75 MG/1; MG/1
100 CAPSULE ORAL 2 TIMES DAILY
Qty: 10 CAPSULE | Refills: 0 | Status: SHIPPED | OUTPATIENT
Start: 2024-07-16

## 2024-07-16 RX ADMIN — CEFTRIAXONE SODIUM 1000 MG: 1 INJECTION, POWDER, FOR SOLUTION INTRAMUSCULAR; INTRAVENOUS at 16:43

## 2024-07-16 NOTE — ED PROVIDER NOTES
Time: 2:36 PM EDT  Date of encounter:  7/16/2024  Independent Historian/Clinical History and Information was obtained by:   Patient    History is limited by: N/A    Chief Complaint: Edema      History of Present Illness:  Patient is a 23 y.o. year old female who presents to the emergency department for evaluation of edema.  The patient reports that she is 18 weeks pregnant.  Patient denies chest pain or shortness of breath.  Patient has no cough hemoptysis.  Patient denies nausea, vomiting, and diarrhea.  Patient has no fever or chills.  Patient denies subjective neurological deficit including numbness and tingling.  Patient does report a very mild headache.      HPI    Patient Care Team  Primary Care Provider: Lovely Andrews APRN    Past Medical History:     Allergies   Allergen Reactions    Morphine Hives and Itching     Past Medical History:   Diagnosis Date    Allergic     Anxiety     Depression     Eczema     Self-injurious behavior     Cut self 10/9/18     Past Surgical History:   Procedure Laterality Date    GASTROSCOPY      TONSILLECTOMY      childhood     Family History   Problem Relation Age of Onset    Schizophrenia Mother     Alcohol abuse Mother     Bipolar disorder Mother     No Known Problems Father     Alcohol abuse Brother     Drug abuse Brother        Home Medications:  Prior to Admission medications    Medication Sig Start Date End Date Taking? Authorizing Provider   aspirin 81 MG EC tablet Take 1 tablet by mouth Daily. 5/8/24 5/8/25  Provider, MD Yvan        Social History:   Social History     Tobacco Use    Smoking status: Former     Current packs/day: 0.00     Types: Cigarettes    Smokeless tobacco: Never   Vaping Use    Vaping status: Never Used   Substance Use Topics    Alcohol use: Not Currently    Drug use: No         Review of Systems:  Review of Systems   Constitutional:  Negative for chills and fever.   HENT:  Negative for congestion, rhinorrhea and sore throat.    Eyes:   "Negative for pain and visual disturbance.   Respiratory:  Negative for apnea, cough, chest tightness and shortness of breath.    Cardiovascular:  Negative for chest pain and palpitations.   Gastrointestinal:  Negative for abdominal pain, diarrhea, nausea and vomiting.   Genitourinary:  Negative for difficulty urinating and dysuria.   Musculoskeletal:  Negative for joint swelling and myalgias.   Skin:  Negative for color change.   Neurological:  Negative for seizures and headaches.   Psychiatric/Behavioral: Negative.     All other systems reviewed and are negative.       Physical Exam:  /68   Pulse 71   Temp 98.5 °F (36.9 °C) (Oral)   Resp 16   Ht 162.6 cm (64\")   Wt 126 kg (278 lb 3.5 oz)   SpO2 98%   BMI 47.76 kg/m²     Physical Exam  Vitals and nursing note reviewed.   Constitutional:       General: She is not in acute distress.     Appearance: Normal appearance. She is not toxic-appearing.   HENT:      Head: Normocephalic and atraumatic.      Jaw: There is normal jaw occlusion.   Eyes:      General: Lids are normal.      Extraocular Movements: Extraocular movements intact.      Conjunctiva/sclera: Conjunctivae normal.      Pupils: Pupils are equal, round, and reactive to light.   Cardiovascular:      Rate and Rhythm: Normal rate and regular rhythm.      Pulses: Normal pulses.      Heart sounds: Normal heart sounds.   Pulmonary:      Effort: Pulmonary effort is normal. No respiratory distress.      Breath sounds: Normal breath sounds. No wheezing or rhonchi.   Abdominal:      General: Abdomen is flat.      Palpations: Abdomen is soft.      Tenderness: There is no abdominal tenderness. There is no guarding or rebound.   Musculoskeletal:         General: Normal range of motion.      Cervical back: Normal range of motion and neck supple.      Right lower leg: Edema present.      Left lower leg: Edema present.   Skin:     General: Skin is warm and dry.   Neurological:      Mental Status: She is alert and " oriented to person, place, and time. Mental status is at baseline.   Psychiatric:         Mood and Affect: Mood normal.                Procedures:  Procedures      Medical Decision Making:      Comorbidities that affect care:    Pregnancy    External Notes reviewed:    Previous ED Note: Patient was seen in emergency department for left foot pain.      The following orders were placed and all results were independently analyzed by me:  Orders Placed This Encounter   Procedures    Comprehensive Metabolic Panel    Urinalysis With Microscopic If Indicated (No Culture) - Urine, Clean Catch    Single High Sensitivity Troponin T    CBC Auto Differential    Urinalysis, Microscopic Only - Urine, Clean Catch    ECG 12 Lead Chest Pain    CBC & Differential    Extra Tubes    Gold Top - SST       Medications Given in the Emergency Department:  Medications   cefTRIAXone (ROCEPHIN) 1,000 mg in sodium chloride 0.9 % 100 mL IVPB-VTB (0 mg Intravenous Stopped 7/16/24 1713)        ED Course:         Labs:    Lab Results (last 24 hours)       Procedure Component Value Units Date/Time    Urinalysis With Microscopic If Indicated (No Culture) - Urine, Clean Catch [[id number]]  (Abnormal) Collected: 07/16/24 1541    Specimen: Urine, Clean Catch Updated: 07/16/24 1605     Color, UA Yellow     Appearance, UA Clear     pH, UA 5.5     Specific Gravity, UA 1.017     Glucose, UA Negative     Ketones, UA 15 mg/dL (1+)     Bilirubin, UA Negative     Blood, UA Negative     Protein, UA Negative     Leuk Esterase, UA Trace     Nitrite, UA Negative     Urobilinogen, UA 0.2 E.U./dL    Urinalysis, Microscopic Only - Urine, Clean Catch [[id number]]  (Abnormal) Collected: 07/16/24 1541    Specimen: Urine, Clean Catch Updated: 07/16/24 1605     RBC, UA 0-2 /HPF      WBC, UA 3-5 /HPF      Bacteria, UA 1+ /HPF      Squamous Epithelial Cells, UA 7-12 /HPF      Hyaline Casts, UA 3-6 /LPF      Methodology Automated Microscopy    CBC & Differential [[id number]]   (Abnormal) Collected: 07/16/24 1626    Specimen: Blood Updated: 07/16/24 1633    Narrative:      The following orders were created for panel order CBC & Differential.  Procedure                               Abnormality         Status                     ---------                               -----------         ------                     CBC Auto Differential[[id number]]        Abnormal            Final result                 Please view results for these tests on the individual orders.    Comprehensive Metabolic Panel [[id number]]  (Abnormal) Collected: 07/16/24 1626    Specimen: Blood Updated: 07/16/24 1650     Glucose 73 mg/dL      BUN 4 mg/dL      Creatinine 0.47 mg/dL      Sodium 136 mmol/L      Potassium 3.9 mmol/L      Chloride 105 mmol/L      CO2 21.8 mmol/L      Calcium 8.6 mg/dL      Total Protein 6.3 g/dL      Albumin 3.4 g/dL      ALT (SGPT) 6 U/L      AST (SGOT) 15 U/L      Alkaline Phosphatase 72 U/L      Total Bilirubin 0.2 mg/dL      Globulin 2.9 gm/dL      A/G Ratio 1.2 g/dL      BUN/Creatinine Ratio 8.5     Anion Gap 9.2 mmol/L      eGFR 135.7 mL/min/1.73     Narrative:      GFR Normal >60  Chronic Kidney Disease <60  Kidney Failure <15      Single High Sensitivity Troponin T [[id number]]  (Normal) Collected: 07/16/24 1626    Specimen: Blood Updated: 07/16/24 1652     HS Troponin T <6 ng/L     Narrative:      High Sensitive Troponin T Reference Range:  <14.0 ng/L- Negative Female for AMI  <22.0 ng/L- Negative Male for AMI  >=14 - Abnormal Female indicating possible myocardial injury.  >=22 - Abnormal Male indicating possible myocardial injury.   Clinicians would have to utilize clinical acumen, EKG, Troponin, and serial changes to determine if it is an Acute Myocardial Infarction or myocardial injury due to an underlying chronic condition.         CBC Auto Differential [[id number]]  (Abnormal) Collected: 07/16/24 1626    Specimen: Blood Updated: 07/16/24 1633     WBC 13.53 10*3/mm3      RBC 3.99  10*6/mm3      Hemoglobin 11.5 g/dL      Hematocrit 34.2 %      MCV 85.7 fL      MCH 28.8 pg      MCHC 33.6 g/dL      RDW 13.8 %      RDW-SD 42.8 fl      MPV 9.9 fL      Platelets 240 10*3/mm3      Neutrophil % 83.4 %      Lymphocyte % 9.2 %      Monocyte % 6.2 %      Eosinophil % 0.5 %      Basophil % 0.2 %      Immature Grans % 0.5 %      Neutrophils, Absolute 11.27 10*3/mm3      Lymphocytes, Absolute 1.25 10*3/mm3      Monocytes, Absolute 0.84 10*3/mm3      Eosinophils, Absolute 0.07 10*3/mm3      Basophils, Absolute 0.03 10*3/mm3      Immature Grans, Absolute 0.07 10*3/mm3      nRBC 0.0 /100 WBC              Imaging:    No Radiology Exams Resulted Within Past 24 Hours      Differential Diagnosis and Discussion:    Edema: Based on the patient's history, signs, and symptoms, the differential diagnosis includes but is not limited to heart failure, kidney disease, liver disease, venous insufficiency, lymphedema, pregnancy, medications, allergic reactions.    All labs were reviewed and interpreted by me.    MDM     The patient´s CBC that was reviewed and interpreted by me shows no abnormalities of critical concern. Of note, there is no anemia requiring a blood transfusion and the platelet count is acceptable.  The patient´s CMP that was reviewed and interpretted by me shows no abnormalities of critical concern. Of note, the patient´s sodium and potassium are acceptable. The patient´s liver enzymes are unremarkable. The patient´s renal function (creatinine) is preserved. The patient has a normal anion gap.  Urinalysis is negative for proteinuria.  It does show +1 bacteriuria.  As the patient is pregnant she will be treated.            Patient Care Considerations:    CT ABDOMEN AND PELVIS: I considered ordering a CT scan of the abdomen and pelvis however abdomen is soft and nontender.      Consultants/Shared Management Plan:    None    Social Determinants of Health:    Patient is independent, reliable, and has access to  care.       Disposition and Care Coordination:    Discharged: I considered escalation of care by admitting this patient to the hospital, however patient reports she will follow-up with her PCP in the next 2 to 3 days.    I have explained the patient´s condition, diagnoses and treatment plan based on the information available to me at this time. I have answered questions and addressed any concerns. The patient has a good  understanding of the patient´s diagnosis, condition, and treatment plan as can be expected at this point. The vital signs have been stable. The patient´s condition is stable and appropriate for discharge from the emergency department.      The patient will pursue further outpatient evaluation with the primary care physician or other designated or consulting physician as outlined in the discharge instructions. They are agreeable to this plan of care and follow-up instructions have been explained in detail. The patient has received these instructions in written format and has expressed an understanding of the discharge instructions. The patient is aware that any significant change in condition or worsening of symptoms should prompt an immediate return to this or the closest emergency department or call to 911.  I have explained discharge medications and the need for follow up with the patient/caretakers. This was also printed in the discharge instructions. Patient was discharged with the following medications and follow up:      Medication List        New Prescriptions      nitrofurantoin (macrocrystal-monohydrate) 100 MG capsule  Commonly known as: MACROBID  Take 1 capsule by mouth 2 (Two) Times a Day.               Where to Get Your Medications        These medications were sent to [id number] - [place] - [address] AT [facility] - [phone] [facility] [phone] FX  [address] [place] [address]      Phone: [phone]   nitrofurantoin  (macrocrystal-monohydrate) 100 MG capsule      Lovely Andrews, APRN  [address]  [place] [address]  [phone]    In 2 days         Final diagnoses:   Acute UTI        ED Disposition       ED Disposition   Discharge    Condition   Stable    Comment   --               This medical record created using voice recognition software.             Segun Gill MD  07/16/24 [id number]

## 2024-07-26 LAB
QT INTERVAL: 388 MS
QTC INTERVAL: 433 MS

## 2024-09-28 PROBLEM — Z87.898 HISTORY OF DYSURIA: Status: ACTIVE | Noted: 2024-09-28

## 2024-12-18 ENCOUNTER — APPOINTMENT (OUTPATIENT)
Dept: CT IMAGING | Facility: HOSPITAL | Age: 24
End: 2024-12-18
Payer: COMMERCIAL

## 2024-12-18 ENCOUNTER — HOSPITAL ENCOUNTER (EMERGENCY)
Facility: HOSPITAL | Age: 24
Discharge: ANOTHER HOSPITAL | End: 2024-12-18
Attending: EMERGENCY MEDICINE | Admitting: EMERGENCY MEDICINE
Payer: COMMERCIAL

## 2024-12-18 ENCOUNTER — APPOINTMENT (OUTPATIENT)
Dept: GENERAL RADIOLOGY | Facility: HOSPITAL | Age: 24
End: 2024-12-18
Payer: COMMERCIAL

## 2024-12-18 ENCOUNTER — APPOINTMENT (OUTPATIENT)
Facility: HOSPITAL | Age: 24
End: 2024-12-18
Payer: COMMERCIAL

## 2024-12-18 VITALS
TEMPERATURE: 98.3 F | SYSTOLIC BLOOD PRESSURE: 117 MMHG | RESPIRATION RATE: 18 BRPM | DIASTOLIC BLOOD PRESSURE: 57 MMHG | OXYGEN SATURATION: 97 % | BODY MASS INDEX: 51.91 KG/M2 | HEIGHT: 63 IN | WEIGHT: 293 LBS | HEART RATE: 81 BPM

## 2024-12-18 DIAGNOSIS — J90 PLEURAL EFFUSION: Primary | ICD-10-CM

## 2024-12-18 DIAGNOSIS — I82.4Z2 ACUTE DEEP VEIN THROMBOSIS (DVT) OF DISTAL VEIN OF LEFT LOWER EXTREMITY: ICD-10-CM

## 2024-12-18 LAB
ALBUMIN SERPL-MCNC: 3 G/DL (ref 3.5–5.2)
ALBUMIN/GLOB SERPL: 0.9 G/DL
ALP SERPL-CCNC: 105 U/L (ref 39–117)
ALT SERPL W P-5'-P-CCNC: 23 U/L (ref 1–33)
ANION GAP SERPL CALCULATED.3IONS-SCNC: 9.8 MMOL/L (ref 5–15)
APTT PPP: 33.2 SECONDS (ref 78–95.9)
AST SERPL-CCNC: 67 U/L (ref 1–32)
BH CV LOW VAS LEFT POSTERIOR TIBIAL VESSEL: 1
BH CV LOWER VASCULAR LEFT COMMON FEMORAL AUGMENT: NORMAL
BH CV LOWER VASCULAR LEFT COMMON FEMORAL COMPETENT: NORMAL
BH CV LOWER VASCULAR LEFT COMMON FEMORAL COMPRESS: NORMAL
BH CV LOWER VASCULAR LEFT COMMON FEMORAL PHASIC: NORMAL
BH CV LOWER VASCULAR LEFT COMMON FEMORAL SPONT: NORMAL
BH CV LOWER VASCULAR LEFT DISTAL FEMORAL COMPRESS: NORMAL
BH CV LOWER VASCULAR LEFT GASTRONEMIUS COMPRESS: NORMAL
BH CV LOWER VASCULAR LEFT GREATER SAPH AK COMPRESS: NORMAL
BH CV LOWER VASCULAR LEFT GREATER SAPH BK COMPRESS: NORMAL
BH CV LOWER VASCULAR LEFT LESSER SAPH COMPRESS: NORMAL
BH CV LOWER VASCULAR LEFT MID FEMORAL AUGMENT: NORMAL
BH CV LOWER VASCULAR LEFT MID FEMORAL COMPETENT: NORMAL
BH CV LOWER VASCULAR LEFT MID FEMORAL COMPRESS: NORMAL
BH CV LOWER VASCULAR LEFT MID FEMORAL PHASIC: NORMAL
BH CV LOWER VASCULAR LEFT MID FEMORAL SPONT: NORMAL
BH CV LOWER VASCULAR LEFT PERONEAL COMPRESS: NORMAL
BH CV LOWER VASCULAR LEFT POPLITEAL AUGMENT: NORMAL
BH CV LOWER VASCULAR LEFT POPLITEAL COMPETENT: NORMAL
BH CV LOWER VASCULAR LEFT POPLITEAL COMPRESS: NORMAL
BH CV LOWER VASCULAR LEFT POPLITEAL PHASIC: NORMAL
BH CV LOWER VASCULAR LEFT POPLITEAL SPONT: NORMAL
BH CV LOWER VASCULAR LEFT POSTERIOR TIBIAL COMPRESS: NORMAL
BH CV LOWER VASCULAR LEFT POSTERIOR TIBIAL THROMBUS: NORMAL
BH CV LOWER VASCULAR LEFT PROXIMAL FEMORAL COMPRESS: NORMAL
BH CV LOWER VASCULAR RIGHT COMMON FEMORAL AUGMENT: NORMAL
BH CV LOWER VASCULAR RIGHT COMMON FEMORAL COMPETENT: NORMAL
BH CV LOWER VASCULAR RIGHT COMMON FEMORAL COMPRESS: NORMAL
BH CV LOWER VASCULAR RIGHT COMMON FEMORAL PHASIC: NORMAL
BH CV LOWER VASCULAR RIGHT COMMON FEMORAL SPONT: NORMAL
BH CV LOWER VASCULAR RIGHT DISTAL FEMORAL COMPRESS: NORMAL
BH CV LOWER VASCULAR RIGHT GASTRONEMIUS COMPRESS: NORMAL
BH CV LOWER VASCULAR RIGHT GREATER SAPH AK COMPRESS: NORMAL
BH CV LOWER VASCULAR RIGHT GREATER SAPH BK COMPRESS: NORMAL
BH CV LOWER VASCULAR RIGHT LESSER SAPH COMPRESS: NORMAL
BH CV LOWER VASCULAR RIGHT MID FEMORAL AUGMENT: NORMAL
BH CV LOWER VASCULAR RIGHT MID FEMORAL COMPETENT: NORMAL
BH CV LOWER VASCULAR RIGHT MID FEMORAL COMPRESS: NORMAL
BH CV LOWER VASCULAR RIGHT MID FEMORAL PHASIC: NORMAL
BH CV LOWER VASCULAR RIGHT MID FEMORAL SPONT: NORMAL
BH CV LOWER VASCULAR RIGHT PERONEAL COMPRESS: NORMAL
BH CV LOWER VASCULAR RIGHT POPLITEAL AUGMENT: NORMAL
BH CV LOWER VASCULAR RIGHT POPLITEAL COMPETENT: NORMAL
BH CV LOWER VASCULAR RIGHT POPLITEAL COMPRESS: NORMAL
BH CV LOWER VASCULAR RIGHT POPLITEAL PHASIC: NORMAL
BH CV LOWER VASCULAR RIGHT POPLITEAL SPONT: NORMAL
BH CV LOWER VASCULAR RIGHT POSTERIOR TIBIAL COMPRESS: NORMAL
BH CV LOWER VASCULAR RIGHT PROXIMAL FEMORAL COMPRESS: NORMAL
BH CV VAS PRELIMINARY FINDINGS SCRIPTING: 1
BILIRUB SERPL-MCNC: 0.3 MG/DL (ref 0–1.2)
BUN SERPL-MCNC: 10 MG/DL (ref 6–20)
BUN/CREAT SERPL: 12.5 (ref 7–25)
CALCIUM SPEC-SCNC: 8.3 MG/DL (ref 8.6–10.5)
CHLORIDE SERPL-SCNC: 101 MMOL/L (ref 98–107)
CO2 SERPL-SCNC: 25.2 MMOL/L (ref 22–29)
CREAT SERPL-MCNC: 0.8 MG/DL (ref 0.57–1)
DEPRECATED RDW RBC AUTO: 48.1 FL (ref 37–54)
EGFRCR SERPLBLD CKD-EPI 2021: 104.4 ML/MIN/1.73
ERYTHROCYTE [DISTWIDTH] IN BLOOD BY AUTOMATED COUNT: 16.4 % (ref 12.3–15.4)
FLUAV SUBTYP SPEC NAA+PROBE: NOT DETECTED
FLUBV RNA ISLT QL NAA+PROBE: NOT DETECTED
GLOBULIN UR ELPH-MCNC: 3.2 GM/DL
GLUCOSE SERPL-MCNC: 83 MG/DL (ref 65–99)
HCT VFR BLD AUTO: 32.7 % (ref 34–46.6)
HGB BLD-MCNC: 9.8 G/DL (ref 12–15.9)
HOLD SPECIMEN: NORMAL
HOLD SPECIMEN: NORMAL
INR PPP: 1.23 (ref 0.86–1.15)
LIPASE SERPL-CCNC: 9 U/L (ref 13–60)
LYMPHOCYTES # BLD MANUAL: 3.41 10*3/MM3 (ref 0.7–3.1)
LYMPHOCYTES NFR BLD MANUAL: 8 % (ref 5–12)
MAGNESIUM SERPL-MCNC: 2 MG/DL (ref 1.6–2.6)
MCH RBC QN AUTO: 24.4 PG (ref 26.6–33)
MCHC RBC AUTO-ENTMCNC: 30 G/DL (ref 31.5–35.7)
MCV RBC AUTO: 81.3 FL (ref 79–97)
MONOCYTES # BLD: 0.58 10*3/MM3 (ref 0.1–0.9)
NEUTROPHILS # BLD AUTO: 3.27 10*3/MM3 (ref 1.7–7)
NEUTROPHILS NFR BLD MANUAL: 45 % (ref 42.7–76)
NT-PROBNP SERPL-MCNC: 121 PG/ML (ref 0–450)
PLAT MORPH BLD: NORMAL
PLATELET # BLD AUTO: 223 10*3/MM3 (ref 140–450)
PMV BLD AUTO: 9.7 FL (ref 6–12)
POTASSIUM SERPL-SCNC: 3.4 MMOL/L (ref 3.5–5.2)
PROT SERPL-MCNC: 6.2 G/DL (ref 6–8.5)
PROTHROMBIN TIME: 15.7 SECONDS (ref 11.8–14.9)
QT INTERVAL: 337 MS
QTC INTERVAL: 414 MS
RBC # BLD AUTO: 4.02 10*6/MM3 (ref 3.77–5.28)
RBC MORPH BLD: NORMAL
RSV RNA NPH QL NAA+NON-PROBE: NOT DETECTED
SARS-COV-2 RNA RESP QL NAA+PROBE: NOT DETECTED
SCAN SLIDE: NORMAL
SODIUM SERPL-SCNC: 136 MMOL/L (ref 136–145)
VARIANT LYMPHS NFR BLD MANUAL: 47 % (ref 19.6–45.3)
WBC MORPH BLD: NORMAL
WBC NRBC COR # BLD AUTO: 7.26 10*3/MM3 (ref 3.4–10.8)
WHOLE BLOOD HOLD COAG: NORMAL
WHOLE BLOOD HOLD SPECIMEN: NORMAL

## 2024-12-18 PROCEDURE — 85730 THROMBOPLASTIN TIME PARTIAL: CPT

## 2024-12-18 PROCEDURE — 85025 COMPLETE CBC W/AUTO DIFF WBC: CPT

## 2024-12-18 PROCEDURE — 85610 PROTHROMBIN TIME: CPT

## 2024-12-18 PROCEDURE — 71275 CT ANGIOGRAPHY CHEST: CPT

## 2024-12-18 PROCEDURE — 93970 EXTREMITY STUDY: CPT | Performed by: SURGERY

## 2024-12-18 PROCEDURE — 93970 EXTREMITY STUDY: CPT

## 2024-12-18 PROCEDURE — 71045 X-RAY EXAM CHEST 1 VIEW: CPT

## 2024-12-18 PROCEDURE — 99285 EMERGENCY DEPT VISIT HI MDM: CPT

## 2024-12-18 PROCEDURE — 93010 ELECTROCARDIOGRAM REPORT: CPT | Performed by: SPECIALIST

## 2024-12-18 PROCEDURE — 93005 ELECTROCARDIOGRAM TRACING: CPT | Performed by: EMERGENCY MEDICINE

## 2024-12-18 PROCEDURE — 25510000001 IOPAMIDOL PER 1 ML: Performed by: EMERGENCY MEDICINE

## 2024-12-18 PROCEDURE — 96365 THER/PROPH/DIAG IV INF INIT: CPT

## 2024-12-18 PROCEDURE — 36415 COLL VENOUS BLD VENIPUNCTURE: CPT

## 2024-12-18 PROCEDURE — 83690 ASSAY OF LIPASE: CPT

## 2024-12-18 PROCEDURE — 83880 ASSAY OF NATRIURETIC PEPTIDE: CPT

## 2024-12-18 PROCEDURE — 87637 SARSCOV2&INF A&B&RSV AMP PRB: CPT

## 2024-12-18 PROCEDURE — 87040 BLOOD CULTURE FOR BACTERIA: CPT

## 2024-12-18 PROCEDURE — 93005 ELECTROCARDIOGRAM TRACING: CPT

## 2024-12-18 PROCEDURE — 85007 BL SMEAR W/DIFF WBC COUNT: CPT

## 2024-12-18 PROCEDURE — 83735 ASSAY OF MAGNESIUM: CPT

## 2024-12-18 PROCEDURE — 25810000003 SODIUM CHLORIDE 0.9 % SOLUTION

## 2024-12-18 PROCEDURE — 63710000001 ONDANSETRON ODT 4 MG TABLET DISPERSIBLE

## 2024-12-18 PROCEDURE — 25010000002 HEPARIN (PORCINE) 25000-0.45 UT/250ML-% SOLUTION

## 2024-12-18 PROCEDURE — 80053 COMPREHEN METABOLIC PANEL: CPT

## 2024-12-18 RX ORDER — SODIUM CHLORIDE 0.9 % (FLUSH) 0.9 %
10 SYRINGE (ML) INJECTION AS NEEDED
Status: DISCONTINUED | OUTPATIENT
Start: 2024-12-18 | End: 2024-12-19 | Stop reason: HOSPADM

## 2024-12-18 RX ORDER — ACETAMINOPHEN 500 MG
1000 TABLET ORAL ONCE
Status: COMPLETED | OUTPATIENT
Start: 2024-12-18 | End: 2024-12-18

## 2024-12-18 RX ORDER — HEPARIN SODIUM 10000 [USP'U]/100ML
12.9 INJECTION, SOLUTION INTRAVENOUS
Status: DISCONTINUED | OUTPATIENT
Start: 2024-12-18 | End: 2024-12-19 | Stop reason: HOSPADM

## 2024-12-18 RX ORDER — IOPAMIDOL 755 MG/ML
100 INJECTION, SOLUTION INTRAVASCULAR
Status: COMPLETED | OUTPATIENT
Start: 2024-12-18 | End: 2024-12-18

## 2024-12-18 RX ORDER — IBUPROFEN 400 MG/1
800 TABLET, FILM COATED ORAL ONCE
Status: COMPLETED | OUTPATIENT
Start: 2024-12-18 | End: 2024-12-18

## 2024-12-18 RX ORDER — ONDANSETRON 4 MG/1
4 TABLET, ORALLY DISINTEGRATING ORAL ONCE
Status: COMPLETED | OUTPATIENT
Start: 2024-12-18 | End: 2024-12-18

## 2024-12-18 RX ADMIN — IOPAMIDOL 70 ML: 755 INJECTION, SOLUTION INTRAVENOUS at 18:02

## 2024-12-18 RX ADMIN — IBUPROFEN 800 MG: 400 TABLET, FILM COATED ORAL at 20:00

## 2024-12-18 RX ADMIN — HEPARIN SODIUM 12.9 UNITS/KG/HR: 10000 INJECTION, SOLUTION INTRAVENOUS at 21:43

## 2024-12-18 RX ADMIN — ACETAMINOPHEN 1000 MG: 500 TABLET ORAL at 18:19

## 2024-12-18 RX ADMIN — SODIUM CHLORIDE 1000 ML: 9 INJECTION, SOLUTION INTRAVENOUS at 20:15

## 2024-12-18 RX ADMIN — ONDANSETRON 4 MG: 4 TABLET, ORALLY DISINTEGRATING ORAL at 18:19

## 2024-12-18 NOTE — ED PROVIDER NOTES
Time: 4:35 PM EST  Date of encounter:  12/18/2024  Independent Historian/Clinical History and Information was obtained by:   Patient    History is limited by: N/A    Chief Complaint: Shortness of breath      History of Present Illness:  Patient is a 24 y.o. year old female who presents to the emergency department for evaluation of shortness breath.  Patient states that she is 14 days postpartum and has been having increasing shortness of breath as well as bilateral lower extremity edema.  Patient states that it has been difficult to walk movement on the stairs because she is getting so short of breath and she is having episodes of chills as associated.  Patient was seen by her OB/GYN who started her on propranolol due to her having protein in her urine.  Patient continues to take the propranolol twice daily.  Patient states that her symptoms have not improved since that time.  Patient denies any complications at birth.  Patient denies any history of blood clots and was not on a blood thinner during pregnancy.  Patient denies family history of blood clots.      Patient Care Team  Primary Care Provider: Lovely Andrews APRN    Past Medical History:     Allergies   Allergen Reactions    Morphine Hives and Itching     Past Medical History:   Diagnosis Date    Allergic     Anxiety     Depression     Eczema     Self-injurious behavior     Cut self 10/9/18     Past Surgical History:   Procedure Laterality Date    GASTROSCOPY      TONSILLECTOMY      childhood     Family History   Problem Relation Age of Onset    Schizophrenia Mother     Alcohol abuse Mother     Bipolar disorder Mother     No Known Problems Father     Alcohol abuse Brother     Drug abuse Brother        Home Medications:  Prior to Admission medications    Medication Sig Start Date End Date Taking? Authorizing Provider   aspirin 81 MG EC tablet Take 1 tablet by mouth Daily. 5/8/24 5/8/25  Provider, MD Yvan   ferrous gluconate (FERGON) 324 MG  "tablet Take 1 tablet by mouth Daily. 24   ProviderYvan MD   prenatal vitamins (PRENATAL 27-1) 27-1 MG tablet tablet Take 1 tablet by mouth Daily. 24   ProviderYvan MD        Social History:   Social History     Tobacco Use    Smoking status: Former     Current packs/day: 0.00     Types: Cigarettes     Passive exposure: Past    Smokeless tobacco: Never   Vaping Use    Vaping status: Never Used   Substance Use Topics    Alcohol use: Not Currently    Drug use: No         Review of Systems:  Review of Systems   Constitutional:  Negative for fever.   Respiratory:  Positive for cough, chest tightness and shortness of breath. Negative for wheezing.    Cardiovascular:  Positive for leg swelling. Negative for chest pain and palpitations.   Gastrointestinal:  Negative for nausea and vomiting.   Skin:  Positive for wound (surgical site).   Neurological:  Positive for headaches.        Physical Exam:  /57 (BP Location: Left arm, Patient Position: Sitting)   Pulse 81   Temp 98.3 °F (36.8 °C) (Oral)   Resp 18   Ht 160 cm (63\")   Wt (!) 139 kg (306 lb)   SpO2 97%   Breastfeeding Unknown   BMI 54.21 kg/m²     Physical Exam  Constitutional:       Appearance: She is obese.   HENT:      Head: Normocephalic.      Mouth/Throat:      Mouth: Mucous membranes are moist.   Eyes:      Pupils: Pupils are equal, round, and reactive to light.   Cardiovascular:      Rate and Rhythm: Regular rhythm. Tachycardia present.      Heart sounds: Normal heart sounds.   Pulmonary:      Effort: Pulmonary effort is normal.      Breath sounds: Decreased breath sounds present.   Abdominal:      General: Abdomen is flat. A surgical scar is present. There is no distension.      Comments:  scar WNL-no discharge, surrounding edema, Steri-Strips noted, no bleeding   Musculoskeletal:      Cervical back: Neck supple.   Skin:     General: Skin is warm and dry.      Findings: No rash.   Neurological:      General: No " focal deficit present.      Mental Status: She is alert and oriented to person, place, and time.   Psychiatric:         Mood and Affect: Mood normal.         Behavior: Behavior normal.                    Medical Decision Making:      Comorbidities that affect care:    Obesity    External Notes reviewed:    Hospital Discharge Summary: Patient postpartum visit      The following orders were placed and all results were independently analyzed by me:  Orders Placed This Encounter   Procedures    COVID-19, FLU A/B, RSV PCR 1 HR TAT - Swab, Nasopharynx    Blood Culture - Blood,    Blood Culture - Blood,    XR Chest 1 View    CT Angiogram Chest Pulmonary Embolism    [place] Draw    Comprehensive Metabolic Panel    Lipase    BNP    Magnesium    CBC Auto Differential    Scan Slide    Manual Differential    Protime-INR    aPTT    Undress & Gown    Continuous Pulse Oximetry    ECG 12 Lead ED Triage Standing Order; Chest Pain    CBC & Differential    Green Top (Gel)    Lavender Top    Gold Top - SST    Light Blue Top       Medications Given in the Emergency Department:  Medications   acetaminophen (TYLENOL) tablet 1,000 mg (1,000 mg Oral Given 12/18/24 1819)   ondansetron ODT (ZOFRAN-ODT) disintegrating tablet 4 mg (4 mg Oral Given 12/18/24 1819)   iopamidol (ISOVUE-370) 76 % injection 100 mL (70 mL Intravenous Given 12/18/24 1802)   ibuprofen (ADVIL,MOTRIN) tablet 800 mg (800 mg Oral Given 12/18/24 2000)   sodium chloride 0.9 % bolus 1,000 mL (0 mL Intravenous Stopped 12/18/24 2048)        ED Course:    ED Course as of 12/19/24 0130   Thu Dec 19, 2024   0128 Comprehensive Metabolic Panel(!)  The patient´s CMP that was reviewed and interpretted by me shows no abnormalities of critical concern. Of note, the patient´s sodium and potassium are acceptable. The patient´s liver enzymes are unremarkable. The patient´s renal function (creatinine) is preserved. The patient has a normal anion gap. [AS]   0128 CBC & Differential(!)  The  patient´s CBC that was reviewed and interpreted by me shows no abnormalities of critical concern. Of note, there is no anemia requiring a blood transfusion and the platelet count is acceptable.  Of note hemoglobin dropped from prior [AS]   0128 XR Chest 1 View  Edema surrounding lungs [AS]      ED Course User Index  [AS] Seaver, Alyce B, SYD[place]       Labs:    Lab Results (last 24 hours)       Procedure Component Value Units Date/Time    CBC & Differential [[id number]]  (Abnormal) Collected: 12/18/24 1314    Specimen: Blood from Arm, Left Updated: 12/18/24 1425    Narrative:      The following orders were created for panel order CBC & Differential.  Procedure                               Abnormality         Status                     ---------                               -----------         ------                     CBC Auto Differential[[id number]]        Abnormal            Final result               Scan Slide[[id number]]                                       Final result                 Please view results for these tests on the individual orders.    Comprehensive Metabolic Panel [[id number]]  (Abnormal) Collected: 12/18/24 1314    Specimen: Blood from Arm, Left Updated: 12/18/24 1412     Glucose 83 mg/dL      BUN 10 mg/dL      Creatinine 0.80 mg/dL      Sodium 136 mmol/L      Potassium 3.4 mmol/L      Chloride 101 mmol/L      CO2 25.2 mmol/L      Calcium 8.3 mg/dL      Total Protein 6.2 g/dL      Albumin 3.0 g/dL      ALT (SGPT) 23 U/L      AST (SGOT) 67 U/L      Alkaline Phosphatase 105 U/L      Total Bilirubin 0.3 mg/dL      Globulin 3.2 gm/dL      A/G Ratio 0.9 g/dL      BUN/Creatinine Ratio 12.5     Anion Gap 9.8 mmol/L      eGFR 104.4 mL/min/1.73     Narrative:      GFR Categories in Chronic Kidney Disease (CKD)      GFR Category          GFR (mL/min/1.73)    Interpretation  G1                     90 or greater         Normal or high (1)  G2                      60-89                Mild decrease (1)  G3a                    45-59                Mild to moderate decrease  G3b                   30-44                Moderate to severe decrease  G4                    15-29                Severe decrease  G5                    14 or less           Kidney failure          (1)In the absence of evidence of kidney disease, neither GFR category G1 or G2 fulfill the criteria for CKD.    eGFR calculation 2021 CKD-EPI creatinine equation, which does not include race as a factor    Lipase [[id number]]  (Abnormal) Collected: 12/18/24 1314    Specimen: Blood from Arm, Left Updated: 12/18/24 1412     Lipase 9 U/L     BNP [[id number]]  (Normal) Collected: 12/18/24 1314    Specimen: Blood from Arm, Left Updated: 12/18/24 1406     proBNP 121.0 pg/mL     Narrative:      This assay is used as an aid in the diagnosis of individuals suspected of having heart failure. It can be used as an aid in the diagnosis of acute decompensated heart failure (ADHF) in patients presenting with signs and symptoms of ADHF to the emergency department (ED). In addition, NT-proBNP of <300 pg/mL indicates ADHF is not likely.    Age Range Result Interpretation  NT-proBNP Concentration (pg/mL:      <50             Positive            >450                   Gray                 300-450                    Negative             <300    50-75           Positive            >900                  Gray                300-900                  Negative            <300      >75             Positive            >1800                  Gray                300-1800                  Negative            <300    Magnesium [[id number]]  (Normal) Collected: 12/18/24 1314    Specimen: Blood from Arm, Left Updated: 12/18/24 1412     Magnesium 2.0 mg/dL     CBC Auto Differential [[id number]]  (Abnormal) Collected: 12/18/24 1314    Specimen: Blood from Arm, Left Updated: 12/18/24 1425     WBC 7.26 10*3/mm3      RBC 4.02 10*6/mm3      Hemoglobin 9.8 g/dL      Hematocrit 32.7 %      MCV 81.3 fL       MCH 24.4 pg      MCHC 30.0 g/dL      RDW 16.4 %      RDW-SD 48.1 fl      MPV 9.7 fL      Platelets 223 10*3/mm3     Narrative:      The previously reported component NRBC is no longer being reported. Previous result was 0.0 /100 WBC (Reference Range: 0.0-0.2 /100 WBC) on 12/18/2024 at 1413 EST.    Scan Slide [[id number]] Collected: 12/18/24 1314    Specimen: Blood from Arm, Left Updated: 12/18/24 1425     Scan Slide --     Comment: See Manual Differential Results       Manual Differential [[id number]]  (Abnormal) Collected: 12/18/24 1314    Specimen: Blood from Arm, Left Updated: 12/18/24 1425     Neutrophil % 45.0 %      Lymphocyte % 47.0 %      Monocyte % 8.0 %      Neutrophils Absolute 3.27 10*3/mm3      Lymphocytes Absolute 3.41 10*3/mm3      Monocytes Absolute 0.58 10*3/mm3      RBC Morphology Normal     WBC Morphology Normal     Platelet Morphology Normal    COVID-19, FLU A/B, RSV PCR 1 HR TAT - Swab, Nasopharynx [[id number]]  (Normal) Collected: 12/18/24 1826    Specimen: Swab from Nasopharynx Updated: 12/18/24 1915     COVID19 Not Detected     Influenza A PCR Not Detected     Influenza B PCR Not Detected     RSV, PCR Not Detected    Narrative:      Fact sheet for providers: https://www.fda.gov/media/609653/download    Fact sheet for patients: https://www.fda.gov/media/410681/download    Test performed by PCR.    Blood Culture - Blood, Arm, Right [[id number]] Collected: 12/18/24 1944    Specimen: Blood from Arm, Right Updated: 12/18/24 1947    Blood Culture - Blood, Arm, Left [[id number]] Collected: 12/18/24 2011    Specimen: Blood from Arm, Left Updated: 12/18/24 2014    Protime-INR [[id number]]  (Abnormal) Collected: 12/18/24 2101    Specimen: Blood Updated: 12/18/24 2120     Protime 15.7 Seconds      INR 1.23    Narrative:      Suggested Therapeutic Ranges For Oral Anticoagulant Therapy:  Level of Therapy                      INR Target Range  Standard Dose                            2.0-3.0  High Dose                                 2.5-3.5  Patients not receiving anticoagulant  Therapy Normal Range                     0.86-1.15    aPTT [[id number]]  (Abnormal) Collected: 12/18/24 2101    Specimen: Blood Updated: 12/18/24 2120     PTT 33.2 seconds              Imaging:    Duplex Venous Lower Extremity - Bilateral CAR    Result Date: 12/18/2024    Acute left lower extremity deep vein thrombosis noted in the posterial tibial.   All other veins appeared normal bilaterally.     CT Angiogram Chest Pulmonary Embolism    Result Date: 12/18/2024  CT ANGIOGRAM CHEST PULMONARY EMBOLISM Date of Exam: 12/18/2024 5:46 PM EST Indication: Chest pain and SOB. Comparison: Chest radiograph 12/18/2024 Technique: Axial CT images were obtained of the chest after the uneventful intravenous administration of iodinated contrast utilizing pulmonary embolism protocol.  Reconstructed coronal and sagittal images were also obtained. Automated exposure control and iterative construction methods were used. Findings: Thyroid unremarkable. Normal caliber thoracic aorta. Residual thymic tissue. Normal caliber main pulmonary artery without evidence of pulmonary embolism. Small peripheral branches limited in assessment. Heart size normal. Small low-density pericardial effusion. Esophagus unremarkable. No suspicious adenopathy. Trachea patent. Congenital azygous lobe. Negative for consolidation or pneumothorax. Trace pleural effusions. Minimal peripheral interlobar septal thickening example sagittal image 154 series 9.. No suspicious pulmonary nodule. Mild peribronchial thickening. Accessory left hepatic artery off left gastric artery. Splenomegaly measuring 15.3 cm AP dimension. Probable hepatomegaly, incompletely assessed. Abdominal wall soft tissues unremarkable. No acute displaced fracture or aggressive lesion.     Impression: 1. Negative for pulmonary edema. Limited assessment of small peripheral subsegmental branches. 2. Findings suggesting volume  overload/third spacing including small pericardial effusion. Trace pleural effusions and minimal interstitial edema. 3. Peribronchial thickening which could reflect bronchitis in the right clinical setting. 4. Splenomegaly and probable hepatomegaly. Electronically Signed: Boby Garcia MD  12/18/2024 6:20 PM EST  Workstation ID: [id number]    XR Chest 1 View    Result Date: 12/18/2024  XR CHEST 1 VW Date of Exam: 12/18/2024 1:30 PM EST Indication: Chest Pain Triage Protocol. Review of electronic medical records shows that the patient is 2 weeks postpartum. Comparison: Two-view chest x-ray 8/10/2015. Findings: Cardiac silhouette appears borderline in size, but is exaggerated by portable AP technique. There is increased prominence of central pulmonary vessels. Lungs otherwise appear clear. No pneumothorax or large pleural effusion is seen.     Impression: Increased prominence of central pulmonary vessels, question possible pulmonary edema. Electronically Signed: Tasneem Garland  12/18/2024 1:51 PM EST  Workstation ID: [id number]       Differential Diagnosis and Discussion:    Edema: Based on the patient's history, signs, and symptoms, the differential diagnosis includes but is not limited to heart failure, kidney disease, liver disease, venous insufficiency, lymphedema, pregnancy, medications, allergic reactions.    PROCEDURES:    Labs were drawn in the emergency department and all labs were reviewed and interpreted by me.  X-ray were performed in the emergency department and all X-ray impressions were independently interpreted by me.  An EKG was performed and the EKG was interpreted by me.  An EKG was performed and the EKG was interpreted by supervising attending.  CT scan was performed in the emergency department and the CT scan radiology impression was interpreted by me.    ECG 12 Lead ED Triage Standing Order; Chest Pain   Final Result   HEART RATE=90  bpm   RR Zgqowtic=922  ms   OK Akxjvgbc=449  ms   P Horizontal  Axis=4  deg   P Front Axis=31  deg   QRSD Interval=92  ms   QT Jgucgwak=132  ms   PXjY=620  ms   QRS Axis=48  deg   T Wave Axis=34  deg   - NORMAL ECG -   Sinus rhythm   When compared with ECG of 16-Jul-2024 15:45:39,   No significant change   Electronically Signed By: Erick Miguel ([facility]) 2024-12-18 16:04:57   Date and Time of Study:2024-12-18 13:07:52          Procedures    MDM         Patient Care Considerations:    SEPSIS was considered but is NOT present in the emergency department as SIRS criteria is not present.      Consultants/Shared Management Plan:    Consultant: I have discussed the case with Dr. Alicea, Cardiology [facility] who agrees to consult on the patient.  Consultant: I have discussed the case with Dr. Koehler, OBGYN [facility] who states patient to be transferred to [facility] for evaluation due to postpartum status  Transfer Provider: I have discussed the case with Dr. Taylor at [facility] who agrees to accept the patient as a transfer.    Social Determinants of Health:    Patient is independent, reliable, and has access to care.       Disposition and Care Coordination:    Transferred: Through independent evaluation of the patient's history, physical, and imperical data, the patient meets criteria to be transferred to another hospital for evaluation/admission.        Final diagnoses:   Pleural effusion   Acute deep vein thrombosis (DVT) of distal vein of left lower extremity        ED Disposition       ED Disposition   Transfer to Another Facility     Condition   --    Comment   --               This medical record created using voice recognition software.             Seaver, Alyce B, APRN  12/19/24 0130

## 2024-12-19 NOTE — ED NOTES
Chart MAR correction: Approximately 100 mls of normal saline infused. Due to volume overload, order verified and provider agreeable to DC remainder of fluids.

## 2024-12-23 LAB
BACTERIA SPEC AEROBE CULT: NORMAL
BACTERIA SPEC AEROBE CULT: NORMAL